# Patient Record
Sex: MALE | Race: OTHER | NOT HISPANIC OR LATINO
[De-identification: names, ages, dates, MRNs, and addresses within clinical notes are randomized per-mention and may not be internally consistent; named-entity substitution may affect disease eponyms.]

---

## 2024-05-31 PROBLEM — Z00.00 ENCOUNTER FOR PREVENTIVE HEALTH EXAMINATION: Status: ACTIVE | Noted: 2024-05-31

## 2024-06-03 PROBLEM — Z80.0 FAMILY HISTORY OF MALIGNANT NEOPLASM OF COLON: Status: ACTIVE | Noted: 2024-06-03

## 2024-06-04 ENCOUNTER — APPOINTMENT (OUTPATIENT)
Dept: RADIATION ONCOLOGY | Facility: CLINIC | Age: 59
End: 2024-06-04
Payer: COMMERCIAL

## 2024-06-04 VITALS
OXYGEN SATURATION: 98 % | BODY MASS INDEX: 20.49 KG/M2 | HEIGHT: 66.5 IN | SYSTOLIC BLOOD PRESSURE: 121 MMHG | WEIGHT: 129 LBS | HEART RATE: 78 BPM | RESPIRATION RATE: 18 BRPM | DIASTOLIC BLOOD PRESSURE: 68 MMHG

## 2024-06-04 DIAGNOSIS — Z78.9 OTHER SPECIFIED HEALTH STATUS: ICD-10-CM

## 2024-06-04 DIAGNOSIS — Z80.0 FAMILY HISTORY OF MALIGNANT NEOPLASM OF DIGESTIVE ORGANS: ICD-10-CM

## 2024-06-04 DIAGNOSIS — C20 MALIGNANT NEOPLASM OF RECTUM: ICD-10-CM

## 2024-06-04 PROCEDURE — 99205 OFFICE O/P NEW HI 60 MIN: CPT | Mod: 25

## 2024-06-04 NOTE — DISEASE MANAGEMENT
[Clinical] : TNM Stage: c [FreeTextEntry4] : undergoing staging work-up [TTNM] : 4 [NTNM] : 1 [MTNM] : - [N/A] : Currently not applicable

## 2024-06-04 NOTE — PHYSICAL EXAM
[Thin] : thin [Outer Ear] : the ears and nose were normal in appearance [Hearing Threshold Finger Rub Not [place]] : hearing was normal [] : no respiratory distress [Respiration, Rhythm And Depth] : normal respiratory rhythm and effort [Exaggerated Use Of Accessory Muscles For Inspiration] : no accessory muscle use [Abdomen Soft] : soft [Nondistended] : nondistended [Abdomen Tenderness] : non-tender [Normal Seminal Vesicles] : normal seminal vesicles [Normal Scrotum] : normal scrotum [Normal] : oriented to person, place and time, the affect was normal, the mood was normal and not anxious [FreeTextEntry1] : Exam performed with NP Linette present; patient has visible friable tumor extending from the anal verge; I was unable to perform an internal rectal exam due to severe discomfort by the patient and also near circumferential involvement of the anus with tumor making it challenging to perform SHELLY; patient has constant fecal leakage from the anus; no bulmaro blood [de-identified] : palpable approximately 1.5cm right inguinal node

## 2024-06-04 NOTE — HISTORY OF PRESENT ILLNESS
[FreeTextEntry1] : Mr. Enciso is a 58-year-old male with newly diagnosed rectal cancer.  He reports having intermittent stool incontinence for the past 2 years but has gotten progressively worse over the last 6-12 months. He also noticed bright red blood after bowel movements in the past 6 months. He has an extensive family history of colon cancer with his mother passing away from the disease in her 50s as well as his maternal uncle & grandmother.  5/29/24: Colonoscopy at [facility] with Dr. Mahan demonstrated: - tumor from rectum extends into anal canal with a 1.5cm mass extending externally - distal rectum is replaced by ulcerated mucosa, likely necrotic tumor, extending 5cm proximally; near circumferential solid mass seen from 5cm to 7cm with mild narrowing Impression: Malignant neoplasm of rectum Pathology revealed invasive adenocarcinoma, moderately differentiated.   05/31/24: CT Abdomen/Pelvis ([facility]) revealed a rectal mass with CT evidence of T3 disease and concern for T4 disease with inseparable tumor from the right seminal vesicles. No enlarged lymph nodes within the abdomen. No discrete liver lesion.  05/31/24: Patient had a consultation with Dr. Asher who recommended PET CT and MRI rectal protocol for staging.  6/4/24: Mr. Enciso presents in clinic today to discuss treatment with radiation. PET/CT was performed on 6/3/24 as per patient, awaiting results from [facility]. He is also scheduled for MRI rectum tomorrow and an initial consultation with Dr. Tuttle on 6/6/24. Patient reports to be in distress with his fecal incontinence and occasional urinary incontinence. He denies any abdominal pain, weight loss, changes in appetite. Reports a 5-6/10 rectal pain.

## 2024-06-04 NOTE — VITALS
[Maximal Pain Intensity: 6/10] : 6/10 [80: Normal activity with effort; some signs or symptoms of disease.] : 80: Normal activity with effort; some signs or symptoms of disease.  [Date: ____________] : Patient's last distress assessment performed on [unfilled]. [8 - Distress Level] : Distress Level: 8 [70: Cares for self; unalbe to carry on normal activity or do active work.] : 70: Cares for self; unable to carry on normal activity or do active work.

## 2024-06-04 NOTE — REVIEW OF SYSTEMS
[Night Sweats] : no night sweats [Fatigue] : no fatigue [Recent Change In Weight] : ~T no recent weight change [Abdominal Pain] : no abdominal pain [Vomiting] : no vomiting [Constipation] : no constipation [Diarrhea] : no diarrhea [Nocturia] : no nocturia [Urinary Frequency] : no urinary frequency [Negative] : Allergic/Immunologic [FreeTextEntry7] : fecal incontinence [FreeTextEntry8] : urinary incontinence

## 2024-06-05 ENCOUNTER — TRANSCRIPTION ENCOUNTER (OUTPATIENT)
Age: 59
End: 2024-06-05

## 2024-06-26 ENCOUNTER — NON-APPOINTMENT (OUTPATIENT)
Age: 59
End: 2024-06-26

## 2024-06-26 VITALS
RESPIRATION RATE: 16 BRPM | WEIGHT: 130 LBS | HEART RATE: 85 BPM | BODY MASS INDEX: 20.65 KG/M2 | DIASTOLIC BLOOD PRESSURE: 69 MMHG | SYSTOLIC BLOOD PRESSURE: 118 MMHG | HEIGHT: 66.5 IN | OXYGEN SATURATION: 98 %

## 2024-07-02 ENCOUNTER — NON-APPOINTMENT (OUTPATIENT)
Age: 59
End: 2024-07-02

## 2024-07-02 VITALS
BODY MASS INDEX: 20.35 KG/M2 | RESPIRATION RATE: 16 BRPM | HEART RATE: 82 BPM | WEIGHT: 128 LBS | SYSTOLIC BLOOD PRESSURE: 117 MMHG | DIASTOLIC BLOOD PRESSURE: 73 MMHG

## 2024-07-08 ENCOUNTER — NON-APPOINTMENT (OUTPATIENT)
Age: 59
End: 2024-07-08

## 2024-07-08 VITALS
SYSTOLIC BLOOD PRESSURE: 119 MMHG | RESPIRATION RATE: 16 BRPM | OXYGEN SATURATION: 100 % | HEART RATE: 77 BPM | DIASTOLIC BLOOD PRESSURE: 70 MMHG | BODY MASS INDEX: 19.95 KG/M2 | WEIGHT: 125.5 LBS

## 2024-07-16 ENCOUNTER — NON-APPOINTMENT (OUTPATIENT)
Age: 59
End: 2024-07-16

## 2024-07-16 VITALS
RESPIRATION RATE: 16 BRPM | OXYGEN SATURATION: 100 % | HEIGHT: 66.5 IN | DIASTOLIC BLOOD PRESSURE: 88 MMHG | SYSTOLIC BLOOD PRESSURE: 99 MMHG | BODY MASS INDEX: 19.85 KG/M2 | WEIGHT: 125 LBS | HEART RATE: 78 BPM

## 2024-07-16 RX ORDER — SILVER SULFADIAZINE 10 MG/G
1 CREAM TOPICAL TWICE DAILY
Qty: 1 | Refills: 1 | Status: ACTIVE | COMMUNITY
Start: 2024-07-16 | End: 1900-01-01

## 2024-07-22 ENCOUNTER — NON-APPOINTMENT (OUTPATIENT)
Age: 59
End: 2024-07-22

## 2024-07-22 VITALS
RESPIRATION RATE: 16 BRPM | DIASTOLIC BLOOD PRESSURE: 71 MMHG | HEART RATE: 70 BPM | SYSTOLIC BLOOD PRESSURE: 123 MMHG | WEIGHT: 124 LBS | OXYGEN SATURATION: 100 % | BODY MASS INDEX: 19.71 KG/M2

## 2024-07-22 NOTE — DISEASE MANAGEMENT
[Clinical] : TNM Stage: c [III] : III [TTNM] : 4 [MTNM] : - [NTNM] : 1 [de-identified] : 6967 [de-identified] : 7386 [de-identified] : Rectum/pelvis

## 2024-07-22 NOTE — DISEASE MANAGEMENT
[Clinical] : TNM Stage: c [III] : III [TTNM] : 4 [NTNM] : 1 [MTNM] : - [de-identified] : 8926 [de-identified] : 2994 [de-identified] : Rectum/pelvis

## 2024-07-22 NOTE — REVIEW OF SYSTEMS
[Constipation: Grade 0] : Constipation: Grade 0 [Diarrhea: Grade 0] : Diarrhea: Grade 0 [Fecal Incontinence: Grade 0] : Fecal Incontinence: Grade 0 [Rectal Pain: Grade 0] : Rectal Pain: Grade 0 [Fatigue: Grade 1 - Fatigue relieved by rest] : Fatigue: Grade 1 - Fatigue relieved by rest

## 2024-07-22 NOTE — HISTORY OF PRESENT ILLNESS
[FreeTextEntry1] : 6/26/2024 Mr. Enciso presents today for his OTV.  He completed 3/25 fractions to the rectum/pelvis.  The patient reports having stool incontinence with occasional blood in the stool.  He denies any pain, but reports having some discomfort when he sits at times.  His weight is stable, appetite is healthy.  7/2/2024 Mr. Enciso presents today for his OTV.  He completed 7/25 fractions to the rectum/pelvis.  The patient reports having abdominal discomfort over the weekend which has subsided.  He states he had overeaten.  His weight is stable, he reports no skin issues in the rectal area.  The patient will use Aquaphor.    7/8/24 FX 10 today.  Mr. enciso is doing well with treatment.  he confirmed that he is taking Xeloda as prescribed.  His appetite has been OK due to some nausea but has not required any antiemetics.  He still has some stool leakage and a little pressure when passing urine.     7/16/2024 Mr. Enciso presents today for his OTV.  He completed 16/25 fractions to the rectum/pelvis.  The patient denies any blood in stool, diarrhea or pain.  He has some discomfort while sitting.  His appetite is healthy, weight is stable.  Dr. Juan is prescribing Silvadene for patient to use after treatment and at night to the rectal area.  7/22/24 FX 20 of 25.  Mr. enciso confirms that he is taking xeloda as directed.  he denies any diarrhea or difficulty passing urine.  His anal area is excoriated.  He is using Silvadene as directed.

## 2024-07-24 ENCOUNTER — NON-APPOINTMENT (OUTPATIENT)
Age: 59
End: 2024-07-24

## 2024-07-31 ENCOUNTER — NON-APPOINTMENT (OUTPATIENT)
Age: 59
End: 2024-07-31

## 2024-07-31 VITALS
HEART RATE: 80 BPM | WEIGHT: 124.2 LBS | BODY MASS INDEX: 19.75 KG/M2 | SYSTOLIC BLOOD PRESSURE: 124 MMHG | DIASTOLIC BLOOD PRESSURE: 77 MMHG | RESPIRATION RATE: 16 BRPM | OXYGEN SATURATION: 100 %

## 2024-08-01 NOTE — HISTORY OF PRESENT ILLNESS
[FreeTextEntry1] : 6/26/2024 Mr. Enciso presents today for his OTV.  He completed 3/25 fractions to the rectum/pelvis.  The patient reports having stool incontinence with occasional blood in the stool.  He denies any pain, but reports having some discomfort when he sits at times.  His weight is stable, appetite is healthy.  7/2/2024 Mr. Enciso presents today for his OTV.  He completed 7/25 fractions to the rectum/pelvis.  The patient reports having abdominal discomfort over the weekend which has subsided.  He states he had overeaten.  His weight is stable, he reports no skin issues in the rectal area.  The patient will use Aquaphor.    7/8/24 FX 10 today.  Mr. enciso is doing well with treatment.  he confirmed that he is taking Xeloda as prescribed.  His appetite has been OK due to some nausea but has not required any antiemetics.  He still has some stool leakage and a little pressure when passing urine.     7/16/2024 Mr. Enciso presents today for his OTV.  He completed 16/25 fractions to the rectum/pelvis.  The patient denies any blood in stool, diarrhea or pain.  He has some discomfort while sitting.  His appetite is healthy, weight is stable.  Dr. Juan is prescribing Silvadene for patient to use after treatment and at night to the rectal area.  7/22/24 FX 20 of 25.  Mr. enciso confirms that he is taking xeloda as directed.  he denies any diarrhea or difficulty passing urine.  His anal skin is desquamating.  He is using Silvadene as directed.    7/31/24  FX 2/3 boost Pt continues to take his Xeloda as directed.  He does have diarrhea this week after taking his water in preparation for RT otherwise it it not bothersome.  His appetite remains good and he is using the Silvadene cream to the area of desquamation

## 2024-08-01 NOTE — DISEASE MANAGEMENT
[Clinical] : TNM Stage: c [III] : III [TTNM] : 4 [NTNM] : 1 [MTNM] : - [de-identified] : 6621 [de-identified] : Rectum/pelvis [de-identified] : 7249

## 2024-08-01 NOTE — PHYSICAL EXAM
[Thin] : thin [] : no respiratory distress [Exaggerated Use Of Accessory Muscles For Inspiration] : no accessory muscle use [Nondistended] : nondistended [Normal] : oriented to person, place and time, the affect was normal, the mood was normal and not anxious [FreeTextEntry1] : Exam performed with RN Greco present; grade 2 moist desquamation around the anus; significant decrease in size of protruding tumor [de-identified] : moderate erythema w/o desquamation b/l groin [de-identified] : as above

## 2024-08-01 NOTE — PHYSICAL EXAM
[Thin] : thin [] : no respiratory distress [Exaggerated Use Of Accessory Muscles For Inspiration] : no accessory muscle use [Nondistended] : nondistended [Normal] : oriented to person, place and time, the affect was normal, the mood was normal and not anxious [FreeTextEntry1] : Exam performed with RN Greco present; grade 2 moist desquamation around the anus; significant decrease in size of protruding tumor [de-identified] : moderate erythema w/o desquamation b/l groin [de-identified] : as above

## 2024-08-01 NOTE — DISEASE MANAGEMENT
[Clinical] : TNM Stage: c [III] : III [TTNM] : 4 [NTNM] : 1 [MTNM] : - [de-identified] : 4742 [de-identified] : 8659 [de-identified] : Rectum/pelvis

## 2024-08-01 NOTE — REVIEW OF SYSTEMS
[Anal Pain: Grade 1 - Mild pain] : Anal Pain: Grade 1 - Mild pain [Diarrhea: Grade 1 - Increase of <4 stools per day over baseline; mild increase in ostomy output compared to baseline] : Diarrhea: Grade 1 - Increase of <4 stools per day over baseline; mild increase in ostomy output compared to baseline [Fecal Incontinence: Grade 1 - Occasional use of pads required] : Fecal Incontinence: Grade 1 - Occasional use of pads required [Rectal Pain: Grade 1 - Mild pain] : Rectal Pain: Grade 1 - Mild pain [Fatigue: Grade 1 - Fatigue relieved by rest] : Fatigue: Grade 1 - Fatigue relieved by rest [Urinary Tract Pain: Grade 0] : Urinary Tract Pain: Grade 0 [Urinary Urgency: Grade 0] : Urinary Urgency: Grade 0 [Urinary Frequency: Grade 0] : Urinary Frequency: Grade 0 [Pruritus: Grade 0] : Pruritus: Grade 0 [Skin Hyperpigmentation: Grade 0] : Skin Hyperpigmentation: Grade 0 [Dermatitis Radiation: Grade 2 - Moderate to brisk erythema; patchy moist desquamation, mostly confined to skin folds and creases; moderate edema] : Dermatitis Radiation: Grade 2 - Moderate to brisk erythema; patchy moist desquamation, mostly confined to skin folds and creases; moderate edema

## 2024-08-19 ENCOUNTER — APPOINTMENT (OUTPATIENT)
Dept: RADIATION ONCOLOGY | Facility: CLINIC | Age: 59
End: 2024-08-19
Payer: COMMERCIAL

## 2024-08-19 VITALS
HEART RATE: 75 BPM | DIASTOLIC BLOOD PRESSURE: 62 MMHG | OXYGEN SATURATION: 98 % | BODY MASS INDEX: 19.45 KG/M2 | RESPIRATION RATE: 16 BRPM | SYSTOLIC BLOOD PRESSURE: 95 MMHG | WEIGHT: 122.31 LBS

## 2024-08-19 DIAGNOSIS — C20 MALIGNANT NEOPLASM OF RECTUM: ICD-10-CM

## 2024-08-19 PROCEDURE — 99024 POSTOP FOLLOW-UP VISIT: CPT

## 2024-08-20 NOTE — PHYSICAL EXAM
[Thin] : thin [] : no respiratory distress [Respiration, Rhythm And Depth] : normal respiratory rhythm and effort [Exaggerated Use Of Accessory Muscles For Inspiration] : no accessory muscle use [Abdomen Soft] : soft [Nondistended] : nondistended [Abdomen Tenderness] : non-tender [Normal External Genitalia] : normal external genitalia without lesions [Normal Scrotum] : normal scrotum [Normal] : oriented to person, place and time, the affect was normal, the mood was normal and not anxious [FreeTextEntry1] : Exam performed with CARRIE Sue present; significantly decreased size of protruding anal mass; no moist desquamation is still present; some dry desquamation and erythema is evident [de-identified] : Dry desquamation and hyperpigmentation b/l groin; significant decrease in size of previously palpable right inguinal node which is now < 5mm

## 2024-08-20 NOTE — REVIEW OF SYSTEMS
[Constipation: Grade 0] : Constipation: Grade 0 [Diarrhea: Grade 0] : Diarrhea: Grade 0 [Fecal Incontinence: Grade 1 - Occasional use of pads required] : Fecal Incontinence: Grade 1 - Occasional use of pads required [Rectal Pain: Grade 0] : Rectal Pain: Grade 0 [Fatigue: Grade 1 - Fatigue relieved by rest] : Fatigue: Grade 1 - Fatigue relieved by rest [Hematuria: Grade 0] : Hematuria: Grade 0 [Urinary Incontinence: Grade 0] : Urinary Incontinence: Grade 0  [Urinary Retention: Grade 0] : Urinary Retention: Grade 0 [Urinary Tract Pain: Grade 0] : Urinary Tract Pain: Grade 0 [Proctitis: Grade 0] : Proctitis: Grade 0 [Constipation] : no constipation [Diarrhea] : no diarrhea [Negative] : Endocrine [FreeTextEntry7] : fecal incontinence

## 2024-08-20 NOTE — PHYSICAL EXAM
[Thin] : thin [] : no respiratory distress [Respiration, Rhythm And Depth] : normal respiratory rhythm and effort [Exaggerated Use Of Accessory Muscles For Inspiration] : no accessory muscle use [Abdomen Soft] : soft [Nondistended] : nondistended [Abdomen Tenderness] : non-tender [Normal External Genitalia] : normal external genitalia without lesions [Normal Scrotum] : normal scrotum [Normal] : oriented to person, place and time, the affect was normal, the mood was normal and not anxious [FreeTextEntry1] : Exam performed with CARRIE Sue present; significantly decreased size of protruding anal mass; no moist desquamation is still present; some dry desquamation and erythema is evident [de-identified] : Dry desquamation and hyperpigmentation b/l groin; significant decrease in size of previously palpable right inguinal node which is now < 5mm

## 2024-08-20 NOTE — DISEASE MANAGEMENT
[Clinical] : TNM Stage: c [N/A] : Currently not applicable [Pathological] : TNM Stage: p [TTNM] : 4 [NTNM] : 2 [MTNM] : 1 [IV] : IV [de-identified] : 25 fractions to the rectum/pelvis & 3 fractions to the pelvic LN for a total of 5600 cGy completed on 8/1/24

## 2024-08-20 NOTE — HISTORY OF PRESENT ILLNESS
[FreeTextEntry1] : Mr. Enciso is a 59-year-old male with rectal cancer, Stage T4 N1 M1 due to involved inguinal nodes. He presents today for PTE s/p RT 25 fractions to the rectum/pelvis to 5000 cGy and 3 fractions to the pelvic/lymph nodes to 600 cGy = 5600cGy completed on 8/1/24.  Patient is schedule for Mediport placement on 8/23/24 and will start FOLFOX on 9/4/24 for 4 months under the care of Dr. Asher.  He had a follow-up with Dr. Tuttle on 6/20/24 and will see her again after treatment.  8/19/24 MR. Enciso presents today for PTE. He is feeling better. He denies any bleeding, constipation, diarrhea. He reports feeling fatigued and that his fecal incontinence is slightly better. He did have one episode of stomach ache/pain on Saturday, but currently denies any pain. He continues to moisturize his skin as instructed.

## 2024-08-20 NOTE — PHYSICAL EXAM
[Thin] : thin [] : no respiratory distress [Respiration, Rhythm And Depth] : normal respiratory rhythm and effort [Exaggerated Use Of Accessory Muscles For Inspiration] : no accessory muscle use [Abdomen Soft] : soft [Nondistended] : nondistended [Abdomen Tenderness] : non-tender [Normal External Genitalia] : normal external genitalia without lesions [Normal Scrotum] : normal scrotum [Normal] : oriented to person, place and time, the affect was normal, the mood was normal and not anxious [FreeTextEntry1] : Exam performed with CARRIE Sue present; significantly decreased size of protruding anal mass; no moist desquamation is still present; some dry desquamation and erythema is evident [de-identified] : Dry desquamation and hyperpigmentation b/l groin; significant decrease in size of previously palpable right inguinal node which is now < 5mm

## 2024-08-20 NOTE — PHYSICAL EXAM
[Thin] : thin [] : no respiratory distress [Respiration, Rhythm And Depth] : normal respiratory rhythm and effort [Exaggerated Use Of Accessory Muscles For Inspiration] : no accessory muscle use [Abdomen Soft] : soft [Nondistended] : nondistended [Abdomen Tenderness] : non-tender [Normal External Genitalia] : normal external genitalia without lesions [Normal Scrotum] : normal scrotum [Normal] : oriented to person, place and time, the affect was normal, the mood was normal and not anxious [FreeTextEntry1] : Exam performed with CARRIE Sue present; significantly decreased size of protruding anal mass; no moist desquamation is still present; some dry desquamation and erythema is evident [de-identified] : Dry desquamation and hyperpigmentation b/l groin; significant decrease in size of previously palpable right inguinal node which is now < 5mm

## 2024-08-20 NOTE — VITALS
[Maximal Pain Intensity: 0/10] : 0/10 [Least Pain Intensity: 0/10] : 0/10 [90: Able to carry normal activity; minor signs or symptoms of disease.] : 90: Able to carry normal activity; minor signs or symptoms of disease.  [80: Normal activity with effort; some signs or symptoms of disease.] : 80: Normal activity with effort; some signs or symptoms of disease.

## 2024-08-20 NOTE — DISEASE MANAGEMENT
[Clinical] : TNM Stage: c [N/A] : Currently not applicable [Pathological] : TNM Stage: p [TTNM] : 4 [NTNM] : 2 [MTNM] : 1 [IV] : IV [de-identified] : 25 fractions to the rectum/pelvis & 3 fractions to the pelvic LN for a total of 5600 cGy completed on 8/1/24

## 2024-08-20 NOTE — DISEASE MANAGEMENT
[Clinical] : TNM Stage: c [N/A] : Currently not applicable [Pathological] : TNM Stage: p [TTNM] : 4 [NTNM] : 2 [MTNM] : 1 [IV] : IV [de-identified] : 25 fractions to the rectum/pelvis & 3 fractions to the pelvic LN for a total of 5600 cGy completed on 8/1/24

## 2024-08-23 ENCOUNTER — TRANSCRIPTION ENCOUNTER (OUTPATIENT)
Age: 59
End: 2024-08-23

## 2024-09-23 NOTE — HISTORY OF PRESENT ILLNESS
[FreeTextEntry1] : Mr. Enciso is a 59-year-old male with rectal cancer, Stage T4 N1 M1 due to involved inguinal nodes. He presents today for PTE s/p RT 25 fractions to the rectum/pelvis to 5000 cGy and 3 fractions to the pelvic/lymph nodes to 600 cGy = 5600cGy completed on 8/1/24.  Patient is schedule for Mediport placement on 8/23/24 and will start FOLFOX on 9/4/24 for 4 months under the care of Dr. Asher.  He had a follow-up with Dr. Tuttle on 6/20/24 and will see her again after treatment.  8/19/24 MR. Enciso presents today for PTE. He is feeling better. He denies any bleeding, constipation, diarrhea. He reports feeling fatigued and that his fecal incontinence is slightly better. He did have one episode of stomach ache/pain on Saturday, but currently denies any pain. He continues to moisturize his skin as instructed. Dementia

## 2025-01-08 ENCOUNTER — NON-APPOINTMENT (OUTPATIENT)
Age: 60
End: 2025-01-08

## 2025-01-17 ENCOUNTER — NON-APPOINTMENT (OUTPATIENT)
Age: 60
End: 2025-01-17

## 2025-01-22 ENCOUNTER — NON-APPOINTMENT (OUTPATIENT)
Age: 60
End: 2025-01-22

## 2025-01-22 ENCOUNTER — APPOINTMENT (OUTPATIENT)
Dept: COLORECTAL SURGERY | Facility: CLINIC | Age: 60
End: 2025-01-22
Payer: COMMERCIAL

## 2025-01-22 DIAGNOSIS — Z82.3 FAMILY HISTORY OF STROKE: ICD-10-CM

## 2025-01-22 DIAGNOSIS — Z83.3 FAMILY HISTORY OF DIABETES MELLITUS: ICD-10-CM

## 2025-01-22 DIAGNOSIS — C20 MALIGNANT NEOPLASM OF RECTUM: ICD-10-CM

## 2025-01-22 PROCEDURE — 99204 OFFICE O/P NEW MOD 45 MIN: CPT | Mod: 93

## 2025-01-29 VITALS
WEIGHT: 114.64 LBS | OXYGEN SATURATION: 100 % | DIASTOLIC BLOOD PRESSURE: 78 MMHG | HEART RATE: 75 BPM | HEIGHT: 66.5 IN | RESPIRATION RATE: 16 BRPM | SYSTOLIC BLOOD PRESSURE: 128 MMHG | TEMPERATURE: 97 F

## 2025-01-30 ENCOUNTER — OUTPATIENT (OUTPATIENT)
Dept: INPATIENT UNIT | Facility: HOSPITAL | Age: 60
LOS: 1 days | Discharge: HOME | End: 2025-01-30
Payer: COMMERCIAL

## 2025-01-30 ENCOUNTER — APPOINTMENT (OUTPATIENT)
Dept: COLORECTAL SURGERY | Facility: HOSPITAL | Age: 60
End: 2025-01-30

## 2025-01-30 ENCOUNTER — RESULT REVIEW (OUTPATIENT)
Age: 60
End: 2025-01-30

## 2025-01-30 ENCOUNTER — TRANSCRIPTION ENCOUNTER (OUTPATIENT)
Age: 60
End: 2025-01-30

## 2025-01-30 VITALS
HEART RATE: 58 BPM | DIASTOLIC BLOOD PRESSURE: 76 MMHG | SYSTOLIC BLOOD PRESSURE: 134 MMHG | OXYGEN SATURATION: 100 % | RESPIRATION RATE: 18 BRPM

## 2025-01-30 DIAGNOSIS — Z95.828 PRESENCE OF OTHER VASCULAR IMPLANTS AND GRAFTS: Chronic | ICD-10-CM

## 2025-01-30 PROCEDURE — 45331 SIGMOIDOSCOPY AND BIOPSY: CPT | Mod: GC

## 2025-01-30 PROCEDURE — 88305 TISSUE EXAM BY PATHOLOGIST: CPT

## 2025-01-30 PROCEDURE — 88305 TISSUE EXAM BY PATHOLOGIST: CPT | Mod: 26

## 2025-01-30 PROCEDURE — 45331 SIGMOIDOSCOPY AND BIOPSY: CPT

## 2025-01-30 RX ORDER — SODIUM CHLORIDE 9 G/ML
1000 INJECTION, SOLUTION INTRAVENOUS
Refills: 0 | Status: DISCONTINUED | OUTPATIENT
Start: 2025-01-30 | End: 2025-01-30

## 2025-01-30 RX ORDER — SODIUM CHLORIDE 9 G/ML
500 INJECTION, SOLUTION INTRAVENOUS ONCE
Refills: 0 | Status: COMPLETED | OUTPATIENT
Start: 2025-01-30 | End: 2025-01-30

## 2025-01-30 RX ORDER — ACETAMINOPHEN 160 MG/5ML
1000 SUSPENSION ORAL ONCE
Refills: 0 | Status: DISCONTINUED | OUTPATIENT
Start: 2025-01-30 | End: 2025-01-30

## 2025-01-30 RX ADMIN — SODIUM CHLORIDE 75 MILLILITER(S): 9 INJECTION, SOLUTION INTRAVENOUS at 15:25

## 2025-01-30 RX ADMIN — SODIUM CHLORIDE 1000 MILLILITER(S): 9 INJECTION, SOLUTION INTRAVENOUS at 15:24

## 2025-01-30 NOTE — PROGRESS NOTE ADULT - SUBJECTIVE AND OBJECTIVE BOX
Procedure: colonoscopy, with biopsy 1/30  Surgeon: Dr. Barth    S: Pt has no complaints, reports having the same abdominal discomfort from tumor he had before. Denies any CP, SOB, N/V or difficulty breathing.    O:  T(C): 36.3 (01-30-25 @ 14:43), Max: 36.3 (01-30-25 @ 14:43)  T(F): 97.4 (01-30-25 @ 14:43), Max: 97.4 (01-30-25 @ 14:43)  HR: 57 (01-30-25 @ 16:23) (52 - 57)  BP: 135/76 (01-30-25 @ 16:23) (82/50 - 142/82)  RR: 16 (01-30-25 @ 16:23) (12 - 16)  SpO2: 99% (01-30-25 @ 16:23) (99% - 100%)  Wt(kg): --      PE:  Gen: NAD, resting comfortably in bed  C/V: NSR  Pulm: Nonlabored breathing, no respiratory distress  Abd: soft, NT, ND  Extrem: WWP, no calf edema, SCDs in place      A/P: 60 yo M who is s/p Colonoscopy, with biopsy    dc home with outpatient follow-up

## 2025-01-30 NOTE — BRIEF OPERATIVE NOTE - OPERATION/FINDINGS
Both a pediatric and adult colonoscope were used to evaluate the rectum and distal colon for residual tumor. There was a necrotic residual cavity from the prior tumor with normal appearing mucosa on either side. Multiple biopsies were taken of the rectum at approximately 8 cm.

## 2025-01-30 NOTE — ASU DISCHARGE PLAN (ADULT/PEDIATRIC) - ASU DC SPECIAL INSTRUCTIONSFT
Please follow up with Dr. Barth in 2-4 weeks. Call to make an appointment if you do not have one.     You may have some bleeding per rectum and can use gauze as needed to prevent staining.     To help reduce any discomfort, you can take tylenol and/or motrin per the instructions on the bottle.     Please resume all regular home medications unless specifically advised not to take a particular medication.     Please get plenty of rest, continue to ambulate several times per day, and drink adequate amounts of fluids.     *Please call your doctor if you have increased pain, swelling, redness, or drainage from the incision site.  *Any change in your symptoms, or any new symptoms that concern you.

## 2025-01-30 NOTE — ASU DISCHARGE PLAN (ADULT/PEDIATRIC) - FINANCIAL ASSISTANCE
[facility] provides services at a reduced cost to those who are determined to be eligible through [facility]’s financial assistance program. Information regarding [facility]’s financial assistance program can be found by going to [email or web address] or by calling [phone].

## 2025-02-10 ENCOUNTER — APPOINTMENT (OUTPATIENT)
Dept: RADIATION ONCOLOGY | Facility: CLINIC | Age: 60
End: 2025-02-10

## 2025-02-18 ENCOUNTER — NON-APPOINTMENT (OUTPATIENT)
Age: 60
End: 2025-02-18

## 2025-02-24 ENCOUNTER — APPOINTMENT (OUTPATIENT)
Dept: COLORECTAL SURGERY | Facility: CLINIC | Age: 60
End: 2025-02-24
Payer: COMMERCIAL

## 2025-02-24 DIAGNOSIS — C20 MALIGNANT NEOPLASM OF RECTUM: ICD-10-CM

## 2025-02-24 PROCEDURE — 99215 OFFICE O/P EST HI 40 MIN: CPT | Mod: 93

## 2025-03-31 ENCOUNTER — APPOINTMENT (OUTPATIENT)
Dept: COLORECTAL SURGERY | Facility: CLINIC | Age: 60
End: 2025-03-31
Payer: COMMERCIAL

## 2025-03-31 DIAGNOSIS — C20 MALIGNANT NEOPLASM OF RECTUM: ICD-10-CM

## 2025-03-31 PROCEDURE — 99215 OFFICE O/P EST HI 40 MIN: CPT | Mod: 93

## 2025-04-01 RX ORDER — METRONIDAZOLE 500 MG/1
500 TABLET ORAL
Qty: 6 | Refills: 0 | Status: ACTIVE | COMMUNITY
Start: 2025-04-01 | End: 1900-01-01

## 2025-04-01 RX ORDER — CIPROFLOXACIN HYDROCHLORIDE 500 MG/1
500 TABLET, FILM COATED ORAL
Qty: 2 | Refills: 0 | Status: ACTIVE | COMMUNITY
Start: 2025-04-01 | End: 1900-01-01

## 2025-04-21 ENCOUNTER — NON-APPOINTMENT (OUTPATIENT)
Age: 60
End: 2025-04-21

## 2025-04-24 ENCOUNTER — OUTPATIENT (OUTPATIENT)
Dept: OUTPATIENT SERVICES | Facility: HOSPITAL | Age: 60
LOS: 1 days | End: 2025-04-24
Payer: COMMERCIAL

## 2025-04-24 ENCOUNTER — RESULT REVIEW (OUTPATIENT)
Age: 60
End: 2025-04-24

## 2025-04-24 DIAGNOSIS — Z95.828 PRESENCE OF OTHER VASCULAR IMPLANTS AND GRAFTS: Chronic | ICD-10-CM

## 2025-04-24 DIAGNOSIS — C20 MALIGNANT NEOPLASM OF RECTUM: ICD-10-CM

## 2025-04-24 PROCEDURE — 88321 CONSLTJ&REPRT SLD PREP ELSWR: CPT

## 2025-04-25 ENCOUNTER — NON-APPOINTMENT (OUTPATIENT)
Age: 60
End: 2025-04-25

## 2025-05-05 VITALS
DIASTOLIC BLOOD PRESSURE: 76 MMHG | SYSTOLIC BLOOD PRESSURE: 122 MMHG | HEIGHT: 66 IN | TEMPERATURE: 97 F | WEIGHT: 121.47 LBS | RESPIRATION RATE: 18 BRPM | OXYGEN SATURATION: 99 % | HEART RATE: 75 BPM

## 2025-05-05 NOTE — PATIENT PROFILE ADULT - FALL HARM RISK - FALL HARM RISK
Goal Outcome Evaluation:       9/15/23 [id number]:    Orientation: unresponsive- DELANEY, non-verbal. Appears calm. No restless this shift.   Activity: bedrest, q2h T/R per family request.  Diet/BS Checks: puree diet, no eating and drinking. Oral care provided by family.   Tele:  none  IV Access/Drains: L PIV SL, dried blood and positional. Still flushing fine.   Pain Management: PRN IV Morphine, Roxanol. Scheduled Oxycodone.  Abnormal VS/Results: n/a  Bowel/Bladder: incontinent B/B . No voiding , no BM.   Skin/Wounds: scattered bruising  Consults:  GIP, SW  D/C Disposition: patient expected to pass in the hospital.  Other Info:  GIP hospice, Comfort care measures focus. Pt resting comfortable during shift with scheduled Oxycodone-then switching to scheduled Roxanol. No PRN meds given.  Family members at bedside with support.                     No indicators present

## 2025-05-05 NOTE — PATIENT PROFILE ADULT - FALL HARM RISK - UNIVERSAL INTERVENTIONS
Bed in lowest position, wheels locked, appropriate side rails in place/Call bell, personal items and telephone in reach/Instruct patient to call for assistance before getting out of bed or chair/Non-slip footwear when patient is out of bed/[place] to call system/Physically safe environment - no spills, clutter or unnecessary equipment/Purposeful Proactive Rounding/Room/bathroom lighting operational, light cord in reach

## 2025-05-05 NOTE — PATIENT PROFILE ADULT - DO YOU LACK THE NECESSARY SUPPORT TO HELP YOU COPE WITH LIFE CHALLENGES?
[facility] no

## 2025-05-05 NOTE — PATIENT PROFILE ADULT - STATED REASON FOR ADMISSION
[FreeTextEntry2] : left knee pain robotic abdominal perineal resection with colostomy creation , plastics reconstruction of perineum with gracilis flap

## 2025-05-06 ENCOUNTER — APPOINTMENT (OUTPATIENT)
Dept: COLORECTAL SURGERY | Facility: HOSPITAL | Age: 60
End: 2025-05-06

## 2025-05-06 ENCOUNTER — RESULT REVIEW (OUTPATIENT)
Age: 60
End: 2025-05-06

## 2025-05-06 ENCOUNTER — INPATIENT (INPATIENT)
Facility: HOSPITAL | Age: 60
LOS: 12 days | Discharge: HOME HEALTH | End: 2025-05-19
Attending: SURGERY | Admitting: SURGERY
Payer: COMMERCIAL

## 2025-05-06 ENCOUNTER — TRANSCRIPTION ENCOUNTER (OUTPATIENT)
Age: 60
End: 2025-05-06

## 2025-05-06 DIAGNOSIS — Z95.828 PRESENCE OF OTHER VASCULAR IMPLANTS AND GRAFTS: Chronic | ICD-10-CM

## 2025-05-06 LAB
BLD GP AB SCN SERPL QL: NEGATIVE — SIGNIFICANT CHANGE UP
RH IG SCN BLD-IMP: POSITIVE — SIGNIFICANT CHANGE UP

## 2025-05-06 PROCEDURE — 88300 SURGICAL PATH GROSS: CPT | Mod: 26,59

## 2025-05-06 PROCEDURE — 15734 MUSCLE-SKIN GRAFT TRUNK: CPT | Mod: AS

## 2025-05-06 PROCEDURE — 45395 LAP REMOVAL OF RECTUM: CPT | Mod: 62

## 2025-05-06 PROCEDURE — 27080 REMOVAL OF TAIL BONE: CPT | Mod: AS,GC

## 2025-05-06 PROCEDURE — 45395 LAP REMOVAL OF RECTUM: CPT | Mod: AS,GC

## 2025-05-06 PROCEDURE — 52005 CYSTO W/URTRL CATHJ: CPT | Mod: 22

## 2025-05-06 PROCEDURE — 88309 TISSUE EXAM BY PATHOLOGIST: CPT | Mod: 26

## 2025-05-06 PROCEDURE — 45395 LAP REMOVAL OF RECTUM: CPT | Mod: GC,62

## 2025-05-06 PROCEDURE — 27080 REMOVAL OF TAIL BONE: CPT | Mod: GC

## 2025-05-06 DEVICE — STAPLER RELOAD SUREFORM 4-ROW 30X8MM BLU: Type: IMPLANTABLE DEVICE | Status: FUNCTIONAL

## 2025-05-06 DEVICE — URETERAL CATH OPEN END 5FR 70CM: Type: IMPLANTABLE DEVICE | Status: FUNCTIONAL

## 2025-05-06 DEVICE — GUIDEWIRE SENSOR DUAL-FLEX NITINOL STRAIGHT .035" X 150CM: Type: IMPLANTABLE DEVICE | Status: FUNCTIONAL

## 2025-05-06 RX ORDER — HEPARIN SODIUM 1000 [USP'U]/ML
5000 INJECTION INTRAVENOUS; SUBCUTANEOUS ONCE
Refills: 0 | Status: COMPLETED | OUTPATIENT
Start: 2025-05-06 | End: 2025-05-06

## 2025-05-06 RX ORDER — KETOROLAC TROMETHAMINE 30 MG/ML
15 INJECTION, SOLUTION INTRAMUSCULAR; INTRAVENOUS EVERY 6 HOURS
Refills: 0 | Status: DISCONTINUED | OUTPATIENT
Start: 2025-05-06 | End: 2025-05-09

## 2025-05-06 RX ORDER — ACETAMINOPHEN 500 MG/5ML
1000 LIQUID (ML) ORAL ONCE
Refills: 0 | Status: COMPLETED | OUTPATIENT
Start: 2025-05-06 | End: 2025-05-06

## 2025-05-06 RX ORDER — HYDROMORPHONE/SOD CHLOR,ISO/PF 2 MG/10 ML
0.5 SYRINGE (ML) INJECTION EVERY 4 HOURS
Refills: 0 | Status: DISCONTINUED | OUTPATIENT
Start: 2025-05-06 | End: 2025-05-08

## 2025-05-06 RX ORDER — BUPIVACAINE 13.3 MG/ML
20 INJECTION, SUSPENSION, LIPOSOMAL INFILTRATION ONCE
Refills: 0 | Status: DISCONTINUED | OUTPATIENT
Start: 2025-05-06 | End: 2025-05-06

## 2025-05-06 RX ORDER — ACETAMINOPHEN 500 MG/5ML
1000 LIQUID (ML) ORAL EVERY 6 HOURS
Refills: 0 | Status: DISCONTINUED | OUTPATIENT
Start: 2025-05-06 | End: 2025-05-10

## 2025-05-06 RX ORDER — HEPARIN SODIUM 1000 [USP'U]/ML
5000 INJECTION INTRAVENOUS; SUBCUTANEOUS EVERY 8 HOURS
Refills: 0 | Status: DISCONTINUED | OUTPATIENT
Start: 2025-05-06 | End: 2025-05-15

## 2025-05-06 RX ORDER — ONDANSETRON HCL/PF 4 MG/2 ML
4 VIAL (ML) INJECTION EVERY 6 HOURS
Refills: 0 | Status: DISCONTINUED | OUTPATIENT
Start: 2025-05-06 | End: 2025-05-15

## 2025-05-06 RX ORDER — SODIUM CHLORIDE 9 G/1000ML
1000 INJECTION, SOLUTION INTRAVENOUS
Refills: 0 | Status: DISCONTINUED | OUTPATIENT
Start: 2025-05-06 | End: 2025-05-11

## 2025-05-06 RX ADMIN — Medication 1000 MILLIGRAM(S): at 19:22

## 2025-05-06 RX ADMIN — HEPARIN SODIUM 5000 UNIT(S): 1000 INJECTION INTRAVENOUS; SUBCUTANEOUS at 07:45

## 2025-05-06 RX ADMIN — SODIUM CHLORIDE 40 MILLILITER(S): 9 INJECTION, SOLUTION INTRAVENOUS at 17:53

## 2025-05-06 RX ADMIN — HEPARIN SODIUM 5000 UNIT(S): 1000 INJECTION INTRAVENOUS; SUBCUTANEOUS at 22:09

## 2025-05-06 RX ADMIN — KETOROLAC TROMETHAMINE 15 MILLIGRAM(S): 30 INJECTION, SOLUTION INTRAMUSCULAR; INTRAVENOUS at 17:32

## 2025-05-06 RX ADMIN — Medication 1000 MILLIGRAM(S): at 07:45

## 2025-05-06 RX ADMIN — KETOROLAC TROMETHAMINE 15 MILLIGRAM(S): 30 INJECTION, SOLUTION INTRAMUSCULAR; INTRAVENOUS at 23:25

## 2025-05-06 RX ADMIN — Medication 4 MILLIGRAM(S): at 17:32

## 2025-05-06 RX ADMIN — Medication 1000 MILLIGRAM(S): at 08:04

## 2025-05-06 NOTE — OPERATIVE REPORT - OPERATIVE RPOSRT DETAILS
Preoperative diagnosis:   Rectal cancer   Perineal defect s/p abdominoperineal resection     Postoperative diagnosis:   Same      Procedure:   1. Left pedicled gracilis myofascial flap for obliteration of pelvic defect (87629)  2. Complex closure of perineal wound ~15cm (23950, 49394)  3. Complex closure of Left thigh wound ~10-cm length (90214, 15430)     Attending Surgeon: Westley Sánchez MD    Assistant: MANN Barone    I performed all portions of this case with assistance .     Type of Anesthesia: GETA     Fluids: See anesthesia notes     EBL: 10-ml from Plastics portion     Specimens: None per Plastics     Implants: None     Wound class: Clean      Complications: None apparent     Indications: This is a 59 year old male with rectal cancer presenting for abdominal perineal resection and is indicated for such by the Colorectal surgery team. Plastic surgery assistance is requested for management of anticipated complex pelvic and defect and perineal wound.   After a thorough discussion of the risks of surgical intervention including partial or total flap loss, wound dehiscence in perineum or donor site, hematoma, seroma, pain or paresthesias, donor site numbness, bleeding, infection, abscess, injury to surrounding structures requiring intervention or potentially leading to transient or permanent deficits, abnormal or excessive scarring, permanent nerve damage with weakness, need for further revisional or staged surgery, as well as its benefits and alternatives, the patient consented to the procedure with full comprehension confirmed verbally and consent documented in chart.     Details: The patient was encountered prone following coccygectomy and closure of posteriormost aspect of perineal wound by the colorectal surgery team. I assisted with mobilizing the patient into the supine lithotomy position. The perineal defect which measured 15cm in anteroposterior dimension with a 7cm defect width. The preexisting drapes were taken down, the Left thigh was marked out for planned gracilis flap coverage, and given the patient's baseline skin redundancy in the perineal area, a skin paddle was not anticipated. The entire perineum, including the open wound, bilateral thighs circumferentially past the knees, and lower abdomen were prepped out with chloroprep and betadine, and re-draped sterilely.     A time out was performed for this portion of the case, the surgical markings were reinforced, and a longitudinal 10cm incision anterior to the axis of the palpated gracilis muscle was made. Dissection was continued through the subcutaneous tissue and fascia overlying the musculotendinous portion of the gracilis. The interval between the gracilis and adductor longus was appreciated and entered, and dissection carried proximally till the pedicle was appreciated and protected. The pedicle was noted about 8-cm from the groin, with a gracilis muscle of moderate bulk. The pedicle was dissected proximally until the takeoff from the medial circumflex femoral vessels and freed circumferentially to enhance mobility and flap coverage. The gracilis muscle was dissected circumferentially free of surrounding soft tissue attachments using cautery, blunt dissection and the Impact LigaSure device, first proximally to the inferior pubic ramus, then distally towards the insertion at the knee. Once the musculotendinous junction was encountered, the tendinous portion was encircled digitally and divided with the Impact LigaSure device to liberate the muscle. A distal counter incision was thus not required. The gracilis muscle was again confirmed freed up proximal to the vascular pedicle to allow rotation into the perineum off its vascular leash. Electrocautery was used throughout to provide hemostasis.    Once ample reach was attained, the perineal wound defect was again explored and copiously irrigated. Blunt dissection was used to create a perineal subcutaneous tunnel superficial to the proximal origin of the gracilis muscle at the ischiopubic ramus into the perineal defect, and digitally widened to allow smooth passage of the pedicled flap without tension or compression. The flap was then passed into the perineum, and the distal end of the gracilis muscle was parachuted with 2-0 PDS sutures cephalad and posteriorly to provide a hammock to the abdominopelvic structures, all the way up to the pelvic outlet, to obliterate the pelvic dead space. Ample blood flow to the flap was confirmed with doppler ultrasound. The muscle was then inset near circumferentially to fill the dead space securely close off the pelvic outlet. Once the gracilis pedicled flap was adequately inset to fill the pelvic and perineal dead space, bilateral subcutaneous dissection was performed at least 10-cm on either side of the wound opening into the medial thighs, to allow ample mobility for closure over the muscle flap. The flaps were then easily medialized under physiologic tension, and the rest of the perineal closure was performed from anterior to posterior in layers with 2-0 PDS sutures, and the perineal skin was approximated with 3-0 subcuticular sutures.    Attention was then turned to the 10-cm donor site. First, hemostasis was obtained and irrigation performed. A 10-mm flat AMANDA drain was passed from upper lateral Left thigh into the medial thigh donor area, secured with 2-0 silk sutures at the skin, and the fascial layer was loosely approximated with 2-0 PDS interrupted sutures. The deep dermis was closed with 2-0 PDS sutures as well. Subcuticular sutures were deemed redundant given the excellent skin approximation with the dermal sutures. The entire field was then cleansed and dressings consisting of mastisol, steri strips and aquacel were placed on the thigh, while the perineum was dressed with bacitracin, gauze fluffs and mesh underwear, and a loose circumferential ace wrap was placed over the thigh. The drain was connected to bulb suction, and all sponge and instrument counts were accurate at the end of the case. The patient was awoken by the anesthesia team and transported to the PACU in stable condition.     Westley Sánchez MD  [phone]   Preoperative diagnosis:   Rectal cancer   Perineal defect s/p abdominoperineal resection     Postoperative diagnosis:   Same      Procedure:   1. Left pedicled gracilis myofascial flap for obliteration of pelvic defect (84699)  2. Complex closure of perineal wound ~15cm (60203, 61372)  3. Complex closure of Left thigh wound ~10-cm length (47484, 76564)     Attending Surgeon: Westley Sánchez MD    Assistant: MANN Barone    I performed all portions of this case with assistance     Type of Anesthesia: GETA     Fluids: See anesthesia notes     EBL: 10-ml from Plastics portion     Specimens: None per Plastics     Implants: None     Wound class: Clean      Complications: None apparent     Indications: This is a 59 year old male with rectal cancer presenting for abdominal perineal resection and is indicated for such by the Colorectal surgery team. Plastic surgery assistance is requested for management of anticipated complex pelvic and defect and perineal wound.   After a thorough discussion of the risks of surgical intervention including partial or total flap loss, wound dehiscence in perineum or donor site, hematoma, seroma, pain or paresthesias, donor site numbness, bleeding, infection, abscess, injury to surrounding structures requiring intervention or potentially leading to transient or permanent deficits, abnormal or excessive scarring, permanent nerve damage with weakness, need for further revisional or staged surgery, as well as its benefits and alternatives, the patient consented to the procedure with full comprehension confirmed verbally and consent documented in chart.     Details: The patient was encountered prone following coccygectomy and closure of posteriormost aspect of perineal wound by the colorectal surgery team. I assisted with mobilizing the patient into the supine lithotomy position. The perineal defect which measured 15cm in anteroposterior dimension with a 7cm defect width. The preexisting drapes were taken down, the Left thigh was marked out for planned gracilis flap coverage, and given the patient's baseline skin redundancy in the perineal area, a skin paddle was not anticipated. The entire perineum, including the open wound, bilateral thighs circumferentially past the knees, and lower abdomen were prepped out with chloroprep and betadine, and re-draped sterilely.     A time out was performed for this portion of the case, the surgical markings were reinforced, and a longitudinal 10cm incision anterior to the axis of the palpated gracilis muscle was made. Dissection was continued through the subcutaneous tissue and fascia overlying the musculotendinous portion of the gracilis. The interval between the gracilis and adductor longus was appreciated and entered, and dissection carried proximally till the pedicle was appreciated and protected. The pedicle was noted about 8-cm from the groin, with a gracilis muscle of moderate bulk. The pedicle was dissected proximally until the takeoff from the medial circumflex femoral vessels and freed circumferentially to enhance mobility and flap coverage. The gracilis muscle was dissected circumferentially free of surrounding soft tissue attachments using cautery, blunt dissection and the Impact LigaSure device, first proximally to the inferior pubic ramus, then distally towards the insertion at the knee. Once the musculotendinous junction was encountered, the tendinous portion was encircled digitally and divided with the Impact LigaSure device to liberate the muscle. A distal counter incision was thus not required. The gracilis muscle was again confirmed freed up proximal to the vascular pedicle to allow rotation into the perineum off its vascular leash. Electrocautery was used throughout to provide hemostasis.    Once ample reach was attained, the perineal wound defect was again explored and copiously irrigated. Blunt dissection was used to create a perineal subcutaneous tunnel superficial to the proximal origin of the gracilis muscle at the ischiopubic ramus into the perineal defect, and digitally widened to allow smooth passage of the pedicled flap without tension or compression. The flap was then passed into the perineum, and the distal end of the gracilis muscle was parachuted with 2-0 PDS sutures cephalad and posteriorly to provide a hammock to the abdominopelvic structures, all the way up to the pelvic outlet, to obliterate the pelvic dead space. Ample blood flow to the flap was confirmed with doppler ultrasound. The muscle was then inset near circumferentially to fill the dead space securely close off the pelvic outlet. Once the gracilis pedicled flap was adequately inset to fill the pelvic and perineal dead space, bilateral subcutaneous dissection was performed at least 10-cm on either side of the wound opening into the medial thighs, to allow ample mobility for closure over the muscle flap. The flaps were then easily medialized under physiologic tension, and the rest of the perineal closure was performed from anterior to posterior in layers with 2-0 PDS sutures, and the perineal skin was approximated with 3-0 subcuticular sutures.    Attention was then turned to the 10-cm donor site. First, hemostasis was obtained and irrigation performed. A 10-mm flat AMANDA drain was passed from upper lateral Left thigh into the medial thigh donor area, secured with 2-0 silk sutures at the skin, and the fascial layer was loosely approximated with 2-0 PDS interrupted sutures. The deep dermis was closed with 2-0 PDS sutures as well. Subcuticular sutures were deemed redundant given the excellent skin approximation with the dermal sutures. The entire field was then cleansed and dressings consisting of mastisol, steri strips and aquacel were placed on the thigh, while the perineum was dressed with bacitracin, gauze fluffs and mesh underwear, and a loose circumferential ace wrap was placed over the thigh. The drain was connected to bulb suction, and all sponge and instrument counts were accurate at the end of the case. The patient was awoken by the anesthesia team and transported to the PACU in stable condition.     Westley Sánchez MD  [phone]

## 2025-05-06 NOTE — H&P ADULT - HISTORY OF PRESENT ILLNESS
59 year old male with PMH of rectal cancer presents for abdominal perineal resection. Patient underwent colonoscopy on 5/29/24 which showed a mass extending into anal canal, ulcerated mucosa, likely necrotic tumor, pathology consistent with invasive moderately differentiated adenocarcinoma. Patient completed FOLFOX chemotherapy on 12/11/24. Restaging complete and patient was referred for surgery. Currently without complaints.

## 2025-05-06 NOTE — H&P ADULT - ASSESSMENT
59 year old male with PMH of rectal cancer presents for abdominal perineal resection.     to OR  admission post op  ERAS protocol  Colon bundle

## 2025-05-06 NOTE — PROGRESS NOTE ADULT - SUBJECTIVE AND OBJECTIVE BOX
Procedure: 5/6  cystoscopy bilateral stent placement, robotic assisted APR, coccygectomy, end colostomy creation, L gracilis flap recon  Surgeons: Dr. Barth / Dr. Sánchez / Dr. Augustin    S: Pt has no complaints. Denies CP, SOB, DIALLO, calf tenderness. Pain controlled with medication.    O:  T(C): 36.7 (05-06-25 @ 15:20), Max: 36.7 (05-06-25 @ 15:20)  T(F): 98 (05-06-25 @ 15:20), Max: 98 (05-06-25 @ 15:20)  HR: 66 (05-06-25 @ 16:50) (60 - 74)  BP: 141/68 (05-06-25 @ 16:50) (124/66 - 157/74)  RR: 17 (05-06-25 @ 16:50) (13 - 17)  SpO2: 100% (05-06-25 @ 16:50) (98% - 100%)  Wt(kg): --          PHYSICAL EXAM  Gen: NAD, resting comfortably in bed  C/V: NSR  Pulm: Nonlabored breathing, no respiratory distress  Abd: soft, appropriately tender/ND Incisions: CDI; ostomy PPP, no output  Extrem: WWP, no calf edema, SCDs in place

## 2025-05-06 NOTE — BRIEF OPERATIVE NOTE - NSICDXBRIEFPROCEDURE_GEN_ALL_CORE_FT
PROCEDURES:  Cystoscopy, with bilateral ureteral catheterization 06-May-2025 09:13:07 ICG b/l, Ozzie North   PROCEDURES:  Cystoscopy, with bilateral ureteral catheterization 06-May-2025 09:13:07 ICG b/l, freeman, and removal of bladder stones, Ozzie Boss

## 2025-05-06 NOTE — OPERATIVE REPORT - OPERATION/FINDINGS
Large perineal defect (coccygectomy defect closed by the Colorectal surgery team) ~15-cm in AP dimension, and 7cm width, small bowel loops noted prolapsing through the pelvic outlet with paucity of soft tissue within the pelvic space.

## 2025-05-06 NOTE — BRIEF OPERATIVE NOTE - NSICDXBRIEFPROCEDURE_GEN_ALL_CORE_FT
PROCEDURES:  Robot-assisted laparoscopic abdominoperineal resection (APR) 06-May-2025 12:52:58  Inga Figueroa  Coccygectomy 06-May-2025 12:53:05  Inga Figueroa

## 2025-05-06 NOTE — BRIEF OPERATIVE NOTE - PRIMARY SURGEON
2020    [address]  [facility] [address]      [facility]  Clinic Note    Guy Lombardo  : 1962  PCP: Mendoza Ortiz MD    Reason for Visit:     Chief Complaint   Patient presents with   • Follow-up     3 month   • Hypertension   • Hyperlipidemia       History of Present Illness:   Follow-up visit.  This is a telephone visit due to the ongoing viral pandemic.  We obtained informed consent for the patient prior to proceeding with this form of interaction    No major change in his clinical status.  Unfortunately he was unable to tolerate Crestor.  Soon after resuming it he had an aggravation of his muscle cramping and myalgia.  Complicating his evaluation is he has chronic low back discomfort that is probably osteoarthritic in nature as well as bilateral wrist discomfort which I certain is related to his occupation.  He is tried 2 different statin agents now and has been intolerant of both.  We will have to pursue injectable therapy    He has had no suggestion of recurrent tachycardia clinically.    His wife works as a .  Her sister had a markedly abnormal heart scan and needed intervention.  His wife's scan apparently came out favorably    PMHx:     Past Medical History:   Diagnosis Date   • Diabetes (CMS/HCC)    • Dyslipidemia     Dec. 2018   • Essential hypertension    • Supraventricular tachycardia (CMS/HCC)        PSHx:     Past Surgical History:   Procedure Laterality Date   • Ankle scope,aid repair fx,bone defct     • Pr catheter, ablation      Dec. 2018   • Total knee replacement         Family Hx:     Family History   Problem Relation Age of Onset   • Patient is unaware of any medical problems Mother    • Coronary Artery Disease Father         late 30's early 40's       Social Hx:     Social History     Tobacco Use   • Smoking status: Never Smoker   • Smokeless tobacco: Never Used   • Tobacco comment: Never used tobacco. Denies smoking.   Substance  Use Topics   • Alcohol use: Never     Frequency: Never     Comment: denies drinking   • Drug use: No       Allergies:      ALLERGIES:   Allergen Reactions   • Iodine   (Environmental Or Med) HIVES and RASH       Medications:     Current Outpatient Medications   Medication Sig Dispense Refill   • meloxicam (MOBIC) 15 MG tablet      • gabapentin (NEURONTIN) 300 MG capsule 3 times daily.     • rosuvastatin (CRESTOR) 20 MG tablet Take 1 tablet by mouth daily. 90 tablet 3   • losartan (COZAAR) 100 MG tablet Take 1 tablet by mouth daily. 90 tablet 3   • hydrochlorothiazide (HYDRODIURIL) 25 MG tablet Take 1 tablet by mouth daily. 90 tablet 3   • metFORMIN (GLUCOPHAGE-XR) 500 MG 24 hr tablet Take 1 tablet by mouth daily (with breakfast). 180 tablet 1   • losartan-hydrochlorothiazide (HYZAAR) 100-25 MG per tablet Take 1 tablet by mouth daily. 90 tablet 3   • metaxalone (SKELAXIN) 800 MG tablet Take 1 tablet by mouth nightly as needed for Muscle spasms. 30 tablet 1   • ibuprofen (MOTRIN) 800 MG tablet 1 tablet 3 times daily.       No current facility-administered medications for this visit.        Review of Systems:   12 point systems reviewed and negative or clinically irrelevent except as             noted in HPI    Physical Exam:   Vitals:   Visit Vitals  /84   Pulse 85   Wt 125.6 kg (277 lb)   BMI 42.12 kg/m²      No formal examination as this was a telephone visit  Labs:     Cholesterol, Total (mg/dL)   Date Value   12/21/2016 250        LDL Cholesterol (mg/dL)   Date Value   12/21/2016 181        HDL Cholesterol (mg/dL)   Date Value   12/21/2016 43        TRIGLYCERIDES (mg/dL)   Date Value   10/07/2019 133      No results found for: AST   No results found for: ALT    Recent Labs     10/07/19  0809   TRIGLYCERIDE 133       Impression:         1. PSVT (paroxysmal supraventricular tachycardia) (CMS/HCC)    2. Benign essential hypertension    3. Hypercholesterolemia         Asymptomatic gentleman with an abnormal  ultrafast heart scan.  Associated hypertension dyslipidemia and paroxysmal supraventricular tachycardia with prior catheter ablation.  Unfortunately intolerant of statin therapy    Plan:   Will proceed with injectable therapy.  His wife is contacting their insurance company to see what is covered.  There is a new oral agent as well if we have insurance troubles with injectable therapy    For going to stay on his current blood pressure regimen.  I will plan to see him in the [place] office in 6 months.    We reviewed these issues.  I think he has a good understanding and seems comfortable with this approach      Andrea Marie MD  04/01/20     <<----- Click to Select Surgeon Westley Sánchez (Attending)

## 2025-05-06 NOTE — PROGRESS NOTE ADULT - ASSESSMENT
59 year old male with PMH of rectal cancer now s/p cystoscopy bilateral stent placement, robotic assisted APR, coccygectomy, end colostomy creation, L gracilis flap recon    CLD/IVF  pain/nausea control  DVT ppx  freeman 7-10 days  plastics recs: shuffle gait, no sitting, no abduction  AM labs

## 2025-05-07 LAB
ANION GAP SERPL CALC-SCNC: 11 MMOL/L — SIGNIFICANT CHANGE UP (ref 5–17)
BUN SERPL-MCNC: 22 MG/DL — SIGNIFICANT CHANGE UP (ref 7–23)
CALCIUM SERPL-MCNC: 9.1 MG/DL — SIGNIFICANT CHANGE UP (ref 8.4–10.5)
CHLORIDE SERPL-SCNC: 102 MMOL/L — SIGNIFICANT CHANGE UP (ref 96–108)
CO2 SERPL-SCNC: 24 MMOL/L — SIGNIFICANT CHANGE UP (ref 22–31)
CREAT SERPL-MCNC: 0.92 MG/DL — SIGNIFICANT CHANGE UP (ref 0.5–1.3)
EGFR: 96 ML/MIN/1.73M2 — SIGNIFICANT CHANGE UP
EGFR: 96 ML/MIN/1.73M2 — SIGNIFICANT CHANGE UP
GLUCOSE SERPL-MCNC: 111 MG/DL — HIGH (ref 70–99)
HCT VFR BLD CALC: 33.3 % — LOW (ref 39–50)
HGB BLD-MCNC: 10.9 G/DL — LOW (ref 13–17)
MAGNESIUM SERPL-MCNC: 2 MG/DL — SIGNIFICANT CHANGE UP (ref 1.6–2.6)
MCHC RBC-ENTMCNC: 29.1 PG — SIGNIFICANT CHANGE UP (ref 27–34)
MCHC RBC-ENTMCNC: 32.7 G/DL — SIGNIFICANT CHANGE UP (ref 32–36)
MCV RBC AUTO: 89 FL — SIGNIFICANT CHANGE UP (ref 80–100)
NRBC BLD AUTO-RTO: 0 /100 WBCS — SIGNIFICANT CHANGE UP (ref 0–0)
PHOSPHATE SERPL-MCNC: 3.8 MG/DL — SIGNIFICANT CHANGE UP (ref 2.5–4.5)
PLATELET # BLD AUTO: 255 K/UL — SIGNIFICANT CHANGE UP (ref 150–400)
POTASSIUM SERPL-MCNC: 4.4 MMOL/L — SIGNIFICANT CHANGE UP (ref 3.5–5.3)
POTASSIUM SERPL-SCNC: 4.4 MMOL/L — SIGNIFICANT CHANGE UP (ref 3.5–5.3)
RBC # BLD: 3.74 M/UL — LOW (ref 4.2–5.8)
RBC # FLD: 14.5 % — SIGNIFICANT CHANGE UP (ref 10.3–14.5)
SODIUM SERPL-SCNC: 137 MMOL/L — SIGNIFICANT CHANGE UP (ref 135–145)
WBC # BLD: 13.05 K/UL — HIGH (ref 3.8–10.5)
WBC # FLD AUTO: 13.05 K/UL — HIGH (ref 3.8–10.5)

## 2025-05-07 RX ADMIN — Medication 0.5 MILLIGRAM(S): at 20:31

## 2025-05-07 RX ADMIN — Medication 1000 MILLIGRAM(S): at 01:04

## 2025-05-07 RX ADMIN — KETOROLAC TROMETHAMINE 15 MILLIGRAM(S): 30 INJECTION, SOLUTION INTRAMUSCULAR; INTRAVENOUS at 11:11

## 2025-05-07 RX ADMIN — HEPARIN SODIUM 5000 UNIT(S): 1000 INJECTION INTRAVENOUS; SUBCUTANEOUS at 22:10

## 2025-05-07 RX ADMIN — KETOROLAC TROMETHAMINE 15 MILLIGRAM(S): 30 INJECTION, SOLUTION INTRAMUSCULAR; INTRAVENOUS at 23:48

## 2025-05-07 RX ADMIN — Medication 1000 MILLIGRAM(S): at 11:11

## 2025-05-07 RX ADMIN — KETOROLAC TROMETHAMINE 15 MILLIGRAM(S): 30 INJECTION, SOLUTION INTRAMUSCULAR; INTRAVENOUS at 17:46

## 2025-05-07 RX ADMIN — HEPARIN SODIUM 5000 UNIT(S): 1000 INJECTION INTRAVENOUS; SUBCUTANEOUS at 13:47

## 2025-05-07 RX ADMIN — SODIUM CHLORIDE 40 MILLILITER(S): 9 INJECTION, SOLUTION INTRAVENOUS at 17:46

## 2025-05-07 RX ADMIN — Medication 0.5 MILLIGRAM(S): at 21:31

## 2025-05-07 RX ADMIN — KETOROLAC TROMETHAMINE 15 MILLIGRAM(S): 30 INJECTION, SOLUTION INTRAMUSCULAR; INTRAVENOUS at 06:13

## 2025-05-07 RX ADMIN — HEPARIN SODIUM 5000 UNIT(S): 1000 INJECTION INTRAVENOUS; SUBCUTANEOUS at 06:14

## 2025-05-07 RX ADMIN — Medication 1000 MILLIGRAM(S): at 23:48

## 2025-05-07 RX ADMIN — Medication 1000 MILLIGRAM(S): at 17:46

## 2025-05-07 NOTE — PROGRESS NOTE ADULT - SUBJECTIVE AND OBJECTIVE BOX
STATUS-POST: cystoscopy bilateral stent placement, robotic assisted APR, coccygectomy, end colostomy creation, L gracilis flap recon (5/6)    POST-OP DAY: #1    SUBJECTIVE:      MEDICATIONS  (STANDING):  acetaminophen     Tablet .. 1000 milliGRAM(s) Oral every 6 hours  heparin   Injectable 5000 Unit(s) SubCutaneous every 8 hours  ketorolac   Injectable 15 milliGRAM(s) IV Push every 6 hours  lactated ringers. 1000 milliLiter(s) (40 mL/Hr) IV Continuous <Continuous>    MEDICATIONS  (PRN):  HYDROmorphone  Injectable 0.5 milliGRAM(s) IV Push every 4 hours PRN Severe Pain (7 - 10)  ondansetron Injectable 4 milliGRAM(s) IV Push every 6 hours PRN Nausea and/or Vomiting      Vital Signs Last 24 Hrs  T(C): 36.7 (07 May 2025 05:23), Max: 36.8 (06 May 2025 23:42)  T(F): 98.1 (07 May 2025 05:23), Max: 98.2 (06 May 2025 23:42)  HR: 57 (07 May 2025 05:23) (57 - 75)  BP: 106/63 (07 May 2025 05:23) (106/63 - 157/74)  BP(mean): 98 (06 May 2025 17:20) (89 - 107)  RR: 18 (07 May 2025 05:23) (13 - 18)  SpO2: 100% (07 May 2025 05:23) (98% - 100%)    Parameters below as of 07 May 2025 05:23  Patient On (Oxygen Delivery Method): room air        PHYSICAL EXAM  General: NAD, resting comfortably  Pulmonary: normal resp effort  Cardiovascular: NSR  Abdominal: soft, ND/NT, incisions CDI, colostomy   Extremities: WWP, no clubbing/cyanosis/edema  Neuro: A/O x 3, no focal deficits    I&O's Detail    06 May 2025 07:01  -  07 May 2025 05:59  --------------------------------------------------------  IN:    Lactated Ringers: 520 mL  Total IN: 520 mL    OUT:    Bulb (mL): 115 mL    Bulb (mL): 8 mL    Colostomy (mL): 0 mL    Indwelling Catheter - Urethral (mL): 1605 mL  Total OUT: 1728 mL    Total NET: -1208 mL          LABS:                        10.9   13.05 )-----------( 255      ( 07 May 2025 05:54 )             33.3                 RADIOLOGY & ADDITIONAL STUDIES:

## 2025-05-07 NOTE — PHYSICAL THERAPY INITIAL EVALUATION ADULT - DID THE PATIENT HAVE SURGERY?
cystoscopy bilateral stent placement, robotic assisted APR, coccygectomy, end colostomy creation, L gracilis flap recon/yes

## 2025-05-07 NOTE — PHYSICAL THERAPY INITIAL EVALUATION ADULT - ADDITIONAL COMMENTS
independent prior to arrival, no use of AD, difficulty toileting, no falls, 30 steps to enter, has straight cane

## 2025-05-07 NOTE — PROGRESS NOTE ADULT - SUBJECTIVE AND OBJECTIVE BOX
Plastic Surgery Progress Note (pg [id number]: [id number], [facility]: [phone])    SUBJECTIVE  The patient was seen and examined. No acute events overnight.    OBJECTIVE  ___________________________________________________  VITAL SIGNS / I&O's   Vital Signs Last 24 Hrs  T(C): 36.7 (07 May 2025 05:23), Max: 36.8 (06 May 2025 23:42)  T(F): 98.1 (07 May 2025 05:23), Max: 98.2 (06 May 2025 23:42)  HR: 57 (07 May 2025 05:23) (57 - 75)  BP: 106/63 (07 May 2025 05:23) (106/63 - 157/74)  BP(mean): 98 (06 May 2025 17:20) (89 - 107)  RR: 18 (07 May 2025 05:23) (13 - 18)  SpO2: 100% (07 May 2025 05:23) (98% - 100%)    Parameters below as of 07 May 2025 05:23  Patient On (Oxygen Delivery Method): room air          06 May 2025 07:01  -  07 May 2025 07:00  --------------------------------------------------------  IN:    Lactated Ringers: 520 mL  Total IN: 520 mL    OUT:    Bulb (mL): 115 mL    Bulb (mL): 8 mL    Colostomy (mL): 0 mL    Indwelling Catheter - Urethral (mL): 1605 mL  Total OUT: 1728 mL    Total NET: -1208 mL        ___________________________________________________  PHYSICAL EXAM    -- CONSTITUTIONAL: NAD, lying in bed  -- NEURO: Awake, alert  sacrum - incision cdi  LE L thigh - dressing cdi  drain ss    ___________________________________________________  LABS                        10.9   13.05 )-----------( 255      ( 07 May 2025 05:54 )             33.3     07 May 2025 05:54    137    |  102    |  22     ----------------------------<  111    4.4     |  24     |  0.92     Ca    9.1        07 May 2025 05:54  Phos  3.8       07 May 2025 05:54  Mg     2.0       07 May 2025 05:54        CAPILLARY BLOOD GLUCOSE      POCT Blood Glucose.: 97 mg/dL (06 May 2025 07:54)        Urinalysis Basic - ( 07 May 2025 05:54 )    Color: x / Appearance: x / SG: x / pH: x  Gluc: 111 mg/dL / Ketone: x  / Bili: x / Urobili: x   Blood: x / Protein: x / Nitrite: x   Leuk Esterase: x / RBC: x / WBC x   Sq Epi: x / Non Sq Epi: x / Bacteria: x      ___________________________________________________  MICRO  Recent Cultures:    ___________________________________________________  MEDICATIONS  (STANDING):  acetaminophen     Tablet .. 1000 milliGRAM(s) Oral every 6 hours  heparin   Injectable 5000 Unit(s) SubCutaneous every 8 hours  ketorolac   Injectable 15 milliGRAM(s) IV Push every 6 hours  lactated ringers. 1000 milliLiter(s) (40 mL/Hr) IV Continuous <Continuous>    MEDICATIONS  (PRN):  HYDROmorphone  Injectable 0.5 milliGRAM(s) IV Push every 4 hours PRN Severe Pain (7 - 10)  ondansetron Injectable 4 milliGRAM(s) IV Push every 6 hours PRN Nausea and/or Vomiting

## 2025-05-07 NOTE — PHYSICAL THERAPY INITIAL EVALUATION ADULT - PRECAUTIONS/LIMITATIONS, REHAB EVAL
shuffling gait, no ADDUCTION, Team 5 reported to go with Plastics recommendations/fall precautions/surgical precautions

## 2025-05-07 NOTE — PHYSICAL THERAPY INITIAL EVALUATION ADULT - GENERAL OBSERVATIONS, REHAB EVAL
Received supine denies pain +2JP, IV hep, freeman, ostomy. Left as found +RN amanda pardo, call bermeo

## 2025-05-07 NOTE — PROGRESS NOTE ADULT - SUBJECTIVE AND OBJECTIVE BOX
INTERVAL HPI/OVERNIGHT EVENTS: janette CLD -n/-v, pain controlled with regimen, freeman tubing with leakage - nursing reinforced with tape    STATUS POST:  cystoscopy bilateral stent placement, robotic assisted APR, coccygectomy, end colostomy creation, gracilis flap recon    POST OPERATIVE DAY #: 1    SUBJECTIVE:  pt seen at bedside, feeling good. some nausea overnight, no emesis.     MEDICATIONS  (STANDING):  acetaminophen     Tablet .. 1000 milliGRAM(s) Oral every 6 hours  heparin   Injectable 5000 Unit(s) SubCutaneous every 8 hours  ketorolac   Injectable 15 milliGRAM(s) IV Push every 6 hours  lactated ringers. 1000 milliLiter(s) (40 mL/Hr) IV Continuous <Continuous>    MEDICATIONS  (PRN):  HYDROmorphone  Injectable 0.5 milliGRAM(s) IV Push every 4 hours PRN Severe Pain (7 - 10)  ondansetron Injectable 4 milliGRAM(s) IV Push every 6 hours PRN Nausea and/or Vomiting      Vital Signs Last 24 Hrs  T(C): 36.7 (07 May 2025 05:23), Max: 36.8 (06 May 2025 23:42)  T(F): 98.1 (07 May 2025 05:23), Max: 98.2 (06 May 2025 23:42)  HR: 57 (07 May 2025 05:23) (57 - 75)  BP: 106/63 (07 May 2025 05:23) (106/63 - 157/74)  BP(mean): 98 (06 May 2025 17:20) (89 - 107)  RR: 18 (07 May 2025 05:23) (13 - 18)  SpO2: 100% (07 May 2025 05:23) (98% - 100%)    Parameters below as of 07 May 2025 05:23  Patient On (Oxygen Delivery Method): room air        PHYSICAL EXAM:      Constitutional: A&Ox3    Respiratory: non labored breathing, no respiratory distress    Cardiovascular: NSR, RRR    Gastrointestinal: soft, nondistended, appropriate incisional tenderness, incisions c/d/i, AMANDA x 1 to self suction with minimal serosang output, ostomy pink and patent with bowel sweat    Extremities: (-) edema                  I&O's Detail    06 May 2025 07:01  -  07 May 2025 06:52  --------------------------------------------------------  IN:    Lactated Ringers: 520 mL  Total IN: 520 mL    OUT:    Bulb (mL): 115 mL    Bulb (mL): 8 mL    Colostomy (mL): 0 mL    Indwelling Catheter - Urethral (mL): 1605 mL  Total OUT: 1728 mL    Total NET: -1208 mL          LABS:                        10.9   13.05 )-----------( 255      ( 07 May 2025 05:54 )             33.3     05-07    137  |  102  |  22  ----------------------------<  111[H]  4.4   |  24  |  0.92    Ca    9.1      07 May 2025 05:54  Phos  3.8     05-07  Mg     2.0     05-07        Urinalysis Basic - ( 07 May 2025 05:54 )    Color: x / Appearance: x / SG: x / pH: x  Gluc: 111 mg/dL / Ketone: x  / Bili: x / Urobili: x   Blood: x / Protein: x / Nitrite: x   Leuk Esterase: x / RBC: x / WBC x   Sq Epi: x / Non Sq Epi: x / Bacteria: x        RADIOLOGY & ADDITIONAL STUDIES:

## 2025-05-07 NOTE — PROGRESS NOTE ADULT - ASSESSMENT
59 year old male with PMH of rectal cancer now s/p cystoscopy bilateral stent placement, robotic assisted APR, coccygectomy, end colostomy creation, L gracilis flap recon (5/6).    CLD/IVF  Pain/nausea control   DVT ppx  Singleton  Plastics recs: shuffle gait, no sitting, no abduction  Ostomy consult  AM labs

## 2025-05-07 NOTE — PROGRESS NOTE ADULT - ASSESSMENT
59 year old male with PMH of rectal cancer now s/p cystoscopy bilateral stent placement, robotic assisted APR, coccygectomy, end colostomy creation, L gracilis flap recon (5/6).    Plan  - keep thigh drain  - abduction - no adduction  - shuffle walk  - rest per primary  59 year old male with PMH of rectal cancer now s/p cystoscopy bilateral stent placement, robotic assisted APR, coccygectomy, end colostomy creation, L gracilis flap recon (5/6).    Plan  - keep thigh drain  - abduction - no adduction  - shuffle walk  - PRN robaxim   - rest per primary

## 2025-05-08 LAB
ANION GAP SERPL CALC-SCNC: 12 MMOL/L — SIGNIFICANT CHANGE UP (ref 5–17)
BUN SERPL-MCNC: 18 MG/DL — SIGNIFICANT CHANGE UP (ref 7–23)
CALCIUM SERPL-MCNC: 9.1 MG/DL — SIGNIFICANT CHANGE UP (ref 8.4–10.5)
CHLORIDE SERPL-SCNC: 107 MMOL/L — SIGNIFICANT CHANGE UP (ref 96–108)
CO2 SERPL-SCNC: 23 MMOL/L — SIGNIFICANT CHANGE UP (ref 22–31)
CREAT SERPL-MCNC: 0.76 MG/DL — SIGNIFICANT CHANGE UP (ref 0.5–1.3)
EGFR: 104 ML/MIN/1.73M2 — SIGNIFICANT CHANGE UP
EGFR: 104 ML/MIN/1.73M2 — SIGNIFICANT CHANGE UP
GLUCOSE SERPL-MCNC: 95 MG/DL — SIGNIFICANT CHANGE UP (ref 70–99)
HCT VFR BLD CALC: 36.8 % — LOW (ref 39–50)
HGB BLD-MCNC: 11.8 G/DL — LOW (ref 13–17)
MAGNESIUM SERPL-MCNC: 2.1 MG/DL — SIGNIFICANT CHANGE UP (ref 1.6–2.6)
MCHC RBC-ENTMCNC: 29.9 PG — SIGNIFICANT CHANGE UP (ref 27–34)
MCHC RBC-ENTMCNC: 32.1 G/DL — SIGNIFICANT CHANGE UP (ref 32–36)
MCV RBC AUTO: 93.2 FL — SIGNIFICANT CHANGE UP (ref 80–100)
NRBC BLD AUTO-RTO: 0 /100 WBCS — SIGNIFICANT CHANGE UP (ref 0–0)
PHOSPHATE SERPL-MCNC: 2.5 MG/DL — SIGNIFICANT CHANGE UP (ref 2.5–4.5)
PLATELET # BLD AUTO: 279 K/UL — SIGNIFICANT CHANGE UP (ref 150–400)
POTASSIUM SERPL-MCNC: 4 MMOL/L — SIGNIFICANT CHANGE UP (ref 3.5–5.3)
POTASSIUM SERPL-SCNC: 4 MMOL/L — SIGNIFICANT CHANGE UP (ref 3.5–5.3)
RBC # BLD: 3.95 M/UL — LOW (ref 4.2–5.8)
RBC # FLD: 15 % — HIGH (ref 10.3–14.5)
SODIUM SERPL-SCNC: 142 MMOL/L — SIGNIFICANT CHANGE UP (ref 135–145)
WBC # BLD: 11.63 K/UL — HIGH (ref 3.8–10.5)
WBC # FLD AUTO: 11.63 K/UL — HIGH (ref 3.8–10.5)

## 2025-05-08 PROCEDURE — 99233 SBSQ HOSP IP/OBS HIGH 50: CPT

## 2025-05-08 RX ORDER — HYDROMORPHONE/SOD CHLOR,ISO/PF 2 MG/10 ML
0.5 SYRINGE (ML) INJECTION EVERY 6 HOURS
Refills: 0 | Status: DISCONTINUED | OUTPATIENT
Start: 2025-05-08 | End: 2025-05-15

## 2025-05-08 RX ORDER — OXYCODONE HYDROCHLORIDE 30 MG/1
2.5 TABLET ORAL EVERY 6 HOURS
Refills: 0 | Status: DISCONTINUED | OUTPATIENT
Start: 2025-05-08 | End: 2025-05-15

## 2025-05-08 RX ORDER — OXYCODONE HYDROCHLORIDE 30 MG/1
5 TABLET ORAL EVERY 6 HOURS
Refills: 0 | Status: DISCONTINUED | OUTPATIENT
Start: 2025-05-08 | End: 2025-05-15

## 2025-05-08 RX ADMIN — KETOROLAC TROMETHAMINE 15 MILLIGRAM(S): 30 INJECTION, SOLUTION INTRAMUSCULAR; INTRAVENOUS at 06:32

## 2025-05-08 RX ADMIN — Medication 1000 MILLIGRAM(S): at 14:32

## 2025-05-08 RX ADMIN — KETOROLAC TROMETHAMINE 15 MILLIGRAM(S): 30 INJECTION, SOLUTION INTRAMUSCULAR; INTRAVENOUS at 17:10

## 2025-05-08 RX ADMIN — HEPARIN SODIUM 5000 UNIT(S): 1000 INJECTION INTRAVENOUS; SUBCUTANEOUS at 23:00

## 2025-05-08 RX ADMIN — KETOROLAC TROMETHAMINE 15 MILLIGRAM(S): 30 INJECTION, SOLUTION INTRAMUSCULAR; INTRAVENOUS at 23:00

## 2025-05-08 RX ADMIN — HEPARIN SODIUM 5000 UNIT(S): 1000 INJECTION INTRAVENOUS; SUBCUTANEOUS at 06:32

## 2025-05-08 RX ADMIN — KETOROLAC TROMETHAMINE 15 MILLIGRAM(S): 30 INJECTION, SOLUTION INTRAMUSCULAR; INTRAVENOUS at 11:40

## 2025-05-08 RX ADMIN — Medication 1000 MILLIGRAM(S): at 09:11

## 2025-05-08 RX ADMIN — HEPARIN SODIUM 5000 UNIT(S): 1000 INJECTION INTRAVENOUS; SUBCUTANEOUS at 14:32

## 2025-05-08 RX ADMIN — SODIUM CHLORIDE 95 MILLILITER(S): 9 INJECTION, SOLUTION INTRAVENOUS at 07:20

## 2025-05-08 RX ADMIN — Medication 1000 MILLIGRAM(S): at 20:20

## 2025-05-08 NOTE — CHART NOTE - NSCHARTNOTEFT_GEN_A_CORE
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 Cysto stent placement notable for BPH and bladder stones, which were evacuated, otherwise WNL. Freeman left in place at start of case.     Given BPH/Stones and leonardo-urethral dissection, patient should have urologic follow up--patient desires f/up in [facility].       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 Cysto stent placement notable for BPH and bladder stones, which were evacuated, otherwise WNL. Freeman left in place at start of case.     Given BPH/Stones and leonardo-urethral dissection, patient should have urologic follow up--patient desires f/up in [facility]. Phone number for [facility]: [phone]      As well, patient may maintain freeman for 7-10 days to allow urethral rest and healing    Discussed w/attending    DK

## 2025-05-08 NOTE — PROGRESS NOTE ADULT - SUBJECTIVE AND OBJECTIVE BOX
STATUS-POST: cystoscopy bilateral stent placement, robotic assisted APR, coccygectomy, end colostomy creation, L gracilis flap recon (5/6)    POST-OP DAY: #2    ON: soft, nontender, nondistended. Avi clears -n/-v, ostomy -gas,/minimal succus, mena x 2 ss    SUBJECTIVE:      MEDICATIONS  (STANDING):  acetaminophen     Tablet .. 1000 milliGRAM(s) Oral every 6 hours  heparin   Injectable 5000 Unit(s) SubCutaneous every 8 hours  ketorolac   Injectable 15 milliGRAM(s) IV Push every 6 hours  lactated ringers. 1000 milliLiter(s) (40 mL/Hr) IV Continuous <Continuous>    MEDICATIONS  (PRN):  HYDROmorphone  Injectable 0.5 milliGRAM(s) IV Push every 4 hours PRN Severe Pain (7 - 10)  ondansetron Injectable 4 milliGRAM(s) IV Push every 6 hours PRN Nausea and/or Vomiting      Vital Signs Last 24 Hrs  T(C): 36.3 (08 May 2025 05:37), Max: 37 (07 May 2025 20:25)  T(F): 97.3 (08 May 2025 05:37), Max: 98.6 (07 May 2025 20:25)  HR: 59 (08 May 2025 05:37) (55 - 60)  BP: 137/74 (08 May 2025 05:37) (103/59 - 137/74)  BP(mean): 81 (07 May 2025 15:37) (78 - 81)  RR: 17 (08 May 2025 05:37) (15 - 17)  SpO2: 97% (08 May 2025 05:37) (97% - 98%)    Parameters below as of 08 May 2025 05:37  Patient On (Oxygen Delivery Method): room air        PHYSICAL EXAM  General: NAD, resting comfortably  Pulmonary: normal resp effort  Cardiovascular: NSR  Abdominal: soft, nondistended, appropriate incisional tenderness, incisions c/d/i, MENA x 1 to self suction with minimal serosang output, ostomy pink and patent with bowel sweat  Genitourinary: freeman with CYU  Extremities: WWP, no clubbing/cyanosis/edema  Neuro: A/O x 3, no focal deficits    I&O's Detail    06 May 2025 07:01  -  07 May 2025 07:00  --------------------------------------------------------  IN:    Lactated Ringers: 520 mL  Total IN: 520 mL    OUT:    Bulb (mL): 115 mL    Bulb (mL): 8 mL    Colostomy (mL): 0 mL    Indwelling Catheter - Urethral (mL): 1605 mL  Total OUT: 1728 mL    Total NET: -1208 mL      07 May 2025 07:01  -  08 May 2025 06:02  --------------------------------------------------------  IN:    Lactated Ringers: 920 mL  Total IN: 920 mL    OUT:    Bulb (mL): 465 mL    Bulb (mL): 25 mL    Colostomy (mL): 275 mL    Indwelling Catheter - Urethral (mL): 950 mL  Total OUT: 1715 mL    Total NET: -795 mL          LABS:                        10.9   13.05 )-----------( 255      ( 07 May 2025 05:54 )             33.3     05-07    137  |  102  |  22  ----------------------------<  111[H]  4.4   |  24  |  0.92    Ca    9.1      07 May 2025 05:54  Phos  3.8     05-07  Mg     2.0     05-07        Urinalysis Basic - ( 07 May 2025 05:54 )    Color: x / Appearance: x / SG: x / pH: x  Gluc: 111 mg/dL / Ketone: x  / Bili: x / Urobili: x   Blood: x / Protein: x / Nitrite: x   Leuk Esterase: x / RBC: x / WBC x   Sq Epi: x / Non Sq Epi: x / Bacteria: x        RADIOLOGY & ADDITIONAL STUDIES: STATUS-POST: cystoscopy bilateral stent placement, robotic assisted APR, coccygectomy, end colostomy creation, L gracilis flap recon (5/6)    POST-OP DAY: #2    ON: soft, nontender, nondistended. Avi clears -n/-v, ostomy -gas,/minimal succus, mena x 2 ss    SUBJECTIVE: Patient seen and examined at bedside. Moderate abd/perineal pain controlled with medication. Reports mild ostomy output, no gas. Denies nausea/vomiting.      MEDICATIONS  (STANDING):  acetaminophen     Tablet .. 1000 milliGRAM(s) Oral every 6 hours  heparin   Injectable 5000 Unit(s) SubCutaneous every 8 hours  ketorolac   Injectable 15 milliGRAM(s) IV Push every 6 hours  lactated ringers. 1000 milliLiter(s) (40 mL/Hr) IV Continuous <Continuous>    MEDICATIONS  (PRN):  HYDROmorphone  Injectable 0.5 milliGRAM(s) IV Push every 4 hours PRN Severe Pain (7 - 10)  ondansetron Injectable 4 milliGRAM(s) IV Push every 6 hours PRN Nausea and/or Vomiting      Vital Signs Last 24 Hrs  T(C): 36.3 (08 May 2025 05:37), Max: 37 (07 May 2025 20:25)  T(F): 97.3 (08 May 2025 05:37), Max: 98.6 (07 May 2025 20:25)  HR: 59 (08 May 2025 05:37) (55 - 60)  BP: 137/74 (08 May 2025 05:37) (103/59 - 137/74)  BP(mean): 81 (07 May 2025 15:37) (78 - 81)  RR: 17 (08 May 2025 05:37) (15 - 17)  SpO2: 97% (08 May 2025 05:37) (97% - 98%)    Parameters below as of 08 May 2025 05:37  Patient On (Oxygen Delivery Method): room air        PHYSICAL EXAM  General: NAD, resting comfortably  Pulmonary: normal resp effort  Cardiovascular: NSR  Abdominal: soft, nondistended, appropriate incisional tenderness, incisions c/d/i, MENA x 1 to self suction with moderate serosang output, ostomy pink and patent with bowel sweat  Genitourinary: freeman with CYU  Extremities: WWP, no clubbing/cyanosis/edema, JPx1 L thigh, minimal SS output  Neuro: A/O x 3, no focal deficits    I&O's Detail    06 May 2025 07:01  -  07 May 2025 07:00  --------------------------------------------------------  IN:    Lactated Ringers: 520 mL  Total IN: 520 mL    OUT:    Bulb (mL): 115 mL    Bulb (mL): 8 mL    Colostomy (mL): 0 mL    Indwelling Catheter - Urethral (mL): 1605 mL  Total OUT: 1728 mL    Total NET: -1208 mL      07 May 2025 07:01  -  08 May 2025 06:02  --------------------------------------------------------  IN:    Lactated Ringers: 920 mL  Total IN: 920 mL    OUT:    Bulb (mL): 465 mL    Bulb (mL): 25 mL    Colostomy (mL): 275 mL    Indwelling Catheter - Urethral (mL): 950 mL  Total OUT: 1715 mL    Total NET: -795 mL          LABS:                        10.9   13.05 )-----------( 255      ( 07 May 2025 05:54 )             33.3     05-07    137  |  102  |  22  ----------------------------<  111[H]  4.4   |  24  |  0.92    Ca    9.1      07 May 2025 05:54  Phos  3.8     05-07  Mg     2.0     05-07        Urinalysis Basic - ( 07 May 2025 05:54 )    Color: x / Appearance: x / SG: x / pH: x  Gluc: 111 mg/dL / Ketone: x  / Bili: x / Urobili: x   Blood: x / Protein: x / Nitrite: x   Leuk Esterase: x / RBC: x / WBC x   Sq Epi: x / Non Sq Epi: x / Bacteria: x        RADIOLOGY & ADDITIONAL STUDIES:

## 2025-05-08 NOTE — PROGRESS NOTE ADULT - SUBJECTIVE AND OBJECTIVE BOX
Progress Note      Subjective  59 year old male with PMH of rectal cancer now s/p cystoscopy bilateral stent placement, robotic assisted APR, coccygectomy, end colostomy creation, L gracilis flap recon (5/6). Somewhat overwhelmed with drains / freeman care. Resting comfortably    Objective    Vital signs  T(F): , Max: 98.6 (05-07-25 @ 20:25)  HR: 60  BP: 138/76  SpO2: 98%    Output   UOP:   OUT:    Bulb (mL): 465 mL    Bulb (mL): 25 mL    Indwelling Catheter - Urethral (mL): 950 mL  Total OUT: 1440 mL      Gen  Abd    .pe    Diet/Fluids  ,     Labs  Chem:     Cultures:  Urine Cx:   Blood Cx:     Antibiotics:      Imaging

## 2025-05-08 NOTE — PROGRESS NOTE ADULT - ASSESSMENT
59 year old male with PMH of rectal cancer now s/p cystoscopy bilateral stent placement, robotic assisted APR, coccygectomy, end colostomy creation, L gracilis flap recon (5/6).    CLD/IVF  Pain/nausea control   DVT ppx  Singleton  Plastics recs: shuffle gait, no sitting, no adduction  Ostomy consult  AM labs  Dispo: Outpatient PT 59 year old male with PMH of rectal cancer now s/p cystoscopy bilateral stent placement, robotic assisted APR, coccygectomy, end colostomy creation, L gracilis flap recon (5/6).    CLD/IVF  Pain/nausea control   DVT ppx  Singleton  AMANDA x2 (pelvis, L thigh)  Plastics recs: shuffle gait, no sitting, no adduction  Ostomy consult  AM labs  Dispo: Outpatient PT

## 2025-05-08 NOTE — PROGRESS NOTE ADULT - SUBJECTIVE AND OBJECTIVE BOX
Plastic Surgery Progress Note (pg [facility]: [id number], [facility]: [phone])    SUBJECTIVE  The patient was seen and examined. No acute events overnight.    OBJECTIVE  ___________________________________________________  VITAL SIGNS / I&O's   Vital Signs Last 24 Hrs  T(C): 36.3 (08 May 2025 05:37), Max: 37 (07 May 2025 20:25)  T(F): 97.3 (08 May 2025 05:37), Max: 98.6 (07 May 2025 20:25)  HR: 59 (08 May 2025 05:37) (55 - 60)  BP: 137/74 (08 May 2025 05:37) (103/59 - 137/74)  BP(mean): 81 (07 May 2025 15:37) (78 - 81)  RR: 17 (08 May 2025 05:37) (15 - 17)  SpO2: 97% (08 May 2025 05:37) (97% - 98%)    Parameters below as of 08 May 2025 05:37  Patient On (Oxygen Delivery Method): room air          07 May 2025 07:01  -  08 May 2025 07:00  --------------------------------------------------------  IN:    Lactated Ringers: 920 mL  Total IN: 920 mL    OUT:    Bulb (mL): 465 mL    Bulb (mL): 25 mL    Colostomy (mL): 275 mL    Indwelling Catheter - Urethral (mL): 950 mL  Total OUT: 1715 mL    Total NET: -795 mL        ___________________________________________________  PHYSICAL EXAM    -- CONSTITUTIONAL: NAD, lying in bed  -- NEURO: Awake, alert  sacrum - incision cdi  LE L thigh - dressing cdi  drain ss    ___________________________________________________  LABS                        11.8   11.63 )-----------( 279      ( 08 May 2025 05:51 )             36.8     08 May 2025 05:51    142    |  107    |  18     ----------------------------<  95     4.0     |  23     |  0.76     Ca    9.1        08 May 2025 05:51  Phos  2.5       08 May 2025 05:51  Mg     2.1       08 May 2025 05:51        CAPILLARY BLOOD GLUCOSE            Urinalysis Basic - ( 08 May 2025 05:51 )    Color: x / Appearance: x / SG: x / pH: x  Gluc: 95 mg/dL / Ketone: x  / Bili: x / Urobili: x   Blood: x / Protein: x / Nitrite: x   Leuk Esterase: x / RBC: x / WBC x   Sq Epi: x / Non Sq Epi: x / Bacteria: x      ___________________________________________________  MICRO  Recent Cultures:  Specimen Source: Urine #1 Urine Culture, 05-06 @ 08:55; Results   No growth; Gram Stain: --; Organism: --    ___________________________________________________  MEDICATIONS  (STANDING):  acetaminophen     Tablet .. 1000 milliGRAM(s) Oral every 6 hours  heparin   Injectable 5000 Unit(s) SubCutaneous every 8 hours  ketorolac   Injectable 15 milliGRAM(s) IV Push every 6 hours  lactated ringers. 1000 milliLiter(s) (40 mL/Hr) IV Continuous <Continuous>    MEDICATIONS  (PRN):  HYDROmorphone  Injectable 0.5 milliGRAM(s) IV Push every 4 hours PRN Severe Pain (7 - 10)  ondansetron Injectable 4 milliGRAM(s) IV Push every 6 hours PRN Nausea and/or Vomiting

## 2025-05-08 NOTE — PROGRESS NOTE ADULT - ASSESSMENT
59 year old male with PMH of rectal cancer now s/p cystoscopy bilateral stent placement, robotic assisted APR, coccygectomy, end colostomy creation, L gracilis flap recon (5/6).    Plan  - keep thigh drain  - abduction - no adduction  - shuffle walk  - PRN robaxim   - rest per primary

## 2025-05-08 NOTE — PROGRESS NOTE ADULT - ASSESSMENT
59 year old male with PMH of rectal cancer now s/p cystoscopy bilateral stent placement, robotic assisted APR, coccygectomy, end colostomy creation, L gracilis flap recon (5/6). Long discussion today re stones / bph and delayed testing for possible treatment after full recovery from APR.    Plan  - keep freeman for 7-10 days  - please see chart note for Urology Group close to patient's home  - He will need urology follow up if discharged with freeman catheter and also to monitor BPH / bladder stones.

## 2025-05-09 LAB
ANION GAP SERPL CALC-SCNC: 14 MMOL/L — SIGNIFICANT CHANGE UP (ref 5–17)
BUN SERPL-MCNC: 21 MG/DL — SIGNIFICANT CHANGE UP (ref 7–23)
CALCIUM SERPL-MCNC: 9 MG/DL — SIGNIFICANT CHANGE UP (ref 8.4–10.5)
CHLORIDE SERPL-SCNC: 104 MMOL/L — SIGNIFICANT CHANGE UP (ref 96–108)
CO2 SERPL-SCNC: 22 MMOL/L — SIGNIFICANT CHANGE UP (ref 22–31)
CREAT SERPL-MCNC: 0.72 MG/DL — SIGNIFICANT CHANGE UP (ref 0.5–1.3)
EGFR: 106 ML/MIN/1.73M2 — SIGNIFICANT CHANGE UP
EGFR: 106 ML/MIN/1.73M2 — SIGNIFICANT CHANGE UP
GLUCOSE SERPL-MCNC: 89 MG/DL — SIGNIFICANT CHANGE UP (ref 70–99)
HCT VFR BLD CALC: 36.2 % — LOW (ref 39–50)
HGB BLD-MCNC: 11.6 G/DL — LOW (ref 13–17)
MAGNESIUM SERPL-MCNC: 1.8 MG/DL — SIGNIFICANT CHANGE UP (ref 1.6–2.6)
MCHC RBC-ENTMCNC: 29.7 PG — SIGNIFICANT CHANGE UP (ref 27–34)
MCHC RBC-ENTMCNC: 32 G/DL — SIGNIFICANT CHANGE UP (ref 32–36)
MCV RBC AUTO: 92.6 FL — SIGNIFICANT CHANGE UP (ref 80–100)
NRBC BLD AUTO-RTO: 0 /100 WBCS — SIGNIFICANT CHANGE UP (ref 0–0)
PHOSPHATE SERPL-MCNC: 2.8 MG/DL — SIGNIFICANT CHANGE UP (ref 2.5–4.5)
PLATELET # BLD AUTO: 284 K/UL — SIGNIFICANT CHANGE UP (ref 150–400)
POTASSIUM SERPL-MCNC: 3.8 MMOL/L — SIGNIFICANT CHANGE UP (ref 3.5–5.3)
POTASSIUM SERPL-SCNC: 3.8 MMOL/L — SIGNIFICANT CHANGE UP (ref 3.5–5.3)
RBC # BLD: 3.91 M/UL — LOW (ref 4.2–5.8)
RBC # FLD: 14.8 % — HIGH (ref 10.3–14.5)
SODIUM SERPL-SCNC: 140 MMOL/L — SIGNIFICANT CHANGE UP (ref 135–145)
WBC # BLD: 9.64 K/UL — SIGNIFICANT CHANGE UP (ref 3.8–10.5)
WBC # FLD AUTO: 9.64 K/UL — SIGNIFICANT CHANGE UP (ref 3.8–10.5)

## 2025-05-09 RX ORDER — MAGNESIUM SULFATE 500 MG/ML
1 SYRINGE (ML) INJECTION ONCE
Refills: 0 | Status: COMPLETED | OUTPATIENT
Start: 2025-05-09 | End: 2025-05-09

## 2025-05-09 RX ORDER — SODIUM CHLORIDE 9 G/1000ML
1000 INJECTION, SOLUTION INTRAVENOUS ONCE
Refills: 0 | Status: COMPLETED | OUTPATIENT
Start: 2025-05-09 | End: 2025-05-09

## 2025-05-09 RX ORDER — SODIUM CHLORIDE 9 G/1000ML
500 INJECTION, SOLUTION INTRAVENOUS ONCE
Refills: 0 | Status: COMPLETED | OUTPATIENT
Start: 2025-05-09 | End: 2025-05-09

## 2025-05-09 RX ADMIN — KETOROLAC TROMETHAMINE 15 MILLIGRAM(S): 30 INJECTION, SOLUTION INTRAMUSCULAR; INTRAVENOUS at 06:10

## 2025-05-09 RX ADMIN — Medication 1000 MILLIGRAM(S): at 21:13

## 2025-05-09 RX ADMIN — HEPARIN SODIUM 5000 UNIT(S): 1000 INJECTION INTRAVENOUS; SUBCUTANEOUS at 06:10

## 2025-05-09 RX ADMIN — SODIUM CHLORIDE 95 MILLILITER(S): 9 INJECTION, SOLUTION INTRAVENOUS at 15:09

## 2025-05-09 RX ADMIN — HEPARIN SODIUM 5000 UNIT(S): 1000 INJECTION INTRAVENOUS; SUBCUTANEOUS at 14:58

## 2025-05-09 RX ADMIN — KETOROLAC TROMETHAMINE 15 MILLIGRAM(S): 30 INJECTION, SOLUTION INTRAMUSCULAR; INTRAVENOUS at 12:20

## 2025-05-09 RX ADMIN — Medication 1000 MILLIGRAM(S): at 03:51

## 2025-05-09 RX ADMIN — Medication 1000 MILLIGRAM(S): at 15:08

## 2025-05-09 RX ADMIN — HEPARIN SODIUM 5000 UNIT(S): 1000 INJECTION INTRAVENOUS; SUBCUTANEOUS at 22:25

## 2025-05-09 RX ADMIN — Medication 20 MILLIEQUIVALENT(S): at 09:29

## 2025-05-09 RX ADMIN — SODIUM CHLORIDE 1000 MILLILITER(S): 9 INJECTION, SOLUTION INTRAVENOUS at 05:21

## 2025-05-09 RX ADMIN — Medication 100 GRAM(S): at 09:29

## 2025-05-09 RX ADMIN — SODIUM CHLORIDE 500 MILLILITER(S): 9 INJECTION, SOLUTION INTRAVENOUS at 07:24

## 2025-05-09 RX ADMIN — Medication 1000 MILLIGRAM(S): at 09:29

## 2025-05-09 NOTE — DIETITIAN INITIAL EVALUATION ADULT - NSFNSGIIOFT_GEN_A_CORE
05-08-25 @ 07:01  -  05-09-25 @ 07:00  --------------------------------------------------------  OUT:    Colostomy (mL): 130 mL  Total OUT: 130 mL    Total NET: -130 mL      05-09-25 @ 07:01  -  05-09-25 @ 14:12  --------------------------------------------------------  OUT:    Colostomy (mL): 75 mL  Total OUT: 75 mL    Total NET: -75 mL

## 2025-05-09 NOTE — DIETITIAN INITIAL EVALUATION ADULT - OTHER INFO
"59 year old male with PMH of rectal cancer now s/p cystoscopy bilateral stent placement, robotic assisted APR, coccygectomy, end colostomy creation, L gracilis flap recon (5/6)."     Patient seen at bedside on 9ur for nutrition assessment. Reports good appetite prior to admission, endorses consuming 3 meals/day and denies following therapeutic diet / dietary restrictions. Reports appetite increased x past 3 months due to improved sense of taste following completed chemo tx in January - likely meeting >/= 75% estimated nutrient needs at home. Current diet order: on clear liquid diet. Complains of "chemical taste" in mouth and therefore with poor PO intake of liquids but able to tolerate. Reports known food allergy to marlyn - added to EMR / CBORD. No difficulty chewing/swallowing reported. Dosing weight: 121 pounds, reports wt gain since January (status post chemo). No pressure injuries documented, pardeep 20. No edema documented. Denies nausea/vomiting/diarrhea/constipation; colostomy present, pending increased flatus per surgery team. Labs: nutritionally pertinent labs WNL. Meds: LR, zofran PRN, oxycodone PRN. NFPE revealed mild clavicle wasting - does not meet criteria for protein-calorie malnutrition as per ASPEN guidelines at this time. No cultural, ethnic, Latter day food preferences, or difficulties with food cost/ procurement reported. See nutrition recommendations below.

## 2025-05-09 NOTE — PROGRESS NOTE ADULT - SUBJECTIVE AND OBJECTIVE BOX
STATUS-POST: cystoscopy bilateral stent placement, robotic assisted APR, coccygectomy, end colostomy creation, L gracilis flap recon (5/6)    POST-OP DAY: #3    SUBJECTIVE: Patient seen and examined at bedside. Moderate abd/perineal pain controlled with medication. Reports mild ostomy output, no gas. Denies nausea/vomiting.        MEDICATIONS  (STANDING):  acetaminophen     Tablet .. 1000 milliGRAM(s) Oral every 6 hours  heparin   Injectable 5000 Unit(s) SubCutaneous every 8 hours  ketorolac   Injectable 15 milliGRAM(s) IV Push every 6 hours  lactated ringers. 1000 milliLiter(s) (95 mL/Hr) IV Continuous <Continuous>    MEDICATIONS  (PRN):  HYDROmorphone  Injectable 0.5 milliGRAM(s) IV Push every 6 hours PRN Breakthrough pain  ondansetron Injectable 4 milliGRAM(s) IV Push every 6 hours PRN Nausea and/or Vomiting  oxyCODONE    IR 2.5 milliGRAM(s) Oral every 6 hours PRN Moderate Pain (4 - 6)  oxyCODONE    IR 5 milliGRAM(s) Oral every 6 hours PRN Severe Pain (7 - 10)      Vital Signs Last 24 Hrs  T(C): 36.9 (09 May 2025 05:04), Max: 37.2 (08 May 2025 15:59)  T(F): 98.5 (09 May 2025 05:04), Max: 98.9 (08 May 2025 15:59)  HR: 76 (09 May 2025 05:04) (56 - 76)  BP: 143/85 (09 May 2025 05:04) (138/76 - 147/86)  BP(mean): 100 (08 May 2025 15:59) (100 - 100)  RR: 17 (09 May 2025 05:04) (16 - 18)  SpO2: 96% (09 May 2025 05:04) (96% - 98%)    Parameters below as of 09 May 2025 05:04  Patient On (Oxygen Delivery Method): room air        PHYSICAL EXAM  General: NAD, resting comfortably  Pulmonary: normal resp effort  Cardiovascular: NSR  Abdominal: soft, nondistended, appropriate incisional tenderness, incisions c/d/i, AMANDA x 1 to self suction with moderate serosang output, ostomy pink and patent with bowel sweat  Genitourinary: freeman with CYU  Extremities: WWP, no clubbing/cyanosis/edema, JPx1 L thigh, minimal SS output  Neuro: A/O x 3, no focal deficits      I&O's Detail    08 May 2025 07:01  -  09 May 2025 07:00  --------------------------------------------------------  IN:    Lactated Ringers: 2280 mL  Total IN: 2280 mL    OUT:    Bulb (mL): 20 mL    Bulb (mL): 390 mL    Colostomy (mL): 130 mL    Indwelling Catheter - Urethral (mL): 425 mL  Total OUT: 965 mL    Total NET: 1315 mL          LABS:                        11.6   9.64  )-----------( 284      ( 09 May 2025 06:05 )             36.2     05-09    140  |  104  |  21  ----------------------------<  89  3.8   |  22  |  0.72    Ca    9.0      09 May 2025 06:05  Phos  2.8     05-09  Mg     1.8     05-09        Urinalysis Basic - ( 09 May 2025 06:05 )    Color: x / Appearance: x / SG: x / pH: x  Gluc: 89 mg/dL / Ketone: x  / Bili: x / Urobili: x   Blood: x / Protein: x / Nitrite: x   Leuk Esterase: x / RBC: x / WBC x   Sq Epi: x / Non Sq Epi: x / Bacteria: x        RADIOLOGY & ADDITIONAL STUDIES:

## 2025-05-09 NOTE — DIETITIAN INITIAL EVALUATION ADULT - PERTINENT MEDS FT
MEDICATIONS  (STANDING):  acetaminophen     Tablet .. 1000 milliGRAM(s) Oral every 6 hours  heparin   Injectable 5000 Unit(s) SubCutaneous every 8 hours  lactated ringers. 1000 milliLiter(s) (95 mL/Hr) IV Continuous <Continuous>    MEDICATIONS  (PRN):  HYDROmorphone  Injectable 0.5 milliGRAM(s) IV Push every 6 hours PRN Breakthrough pain  ondansetron Injectable 4 milliGRAM(s) IV Push every 6 hours PRN Nausea and/or Vomiting  oxyCODONE    IR 2.5 milliGRAM(s) Oral every 6 hours PRN Moderate Pain (4 - 6)  oxyCODONE    IR 5 milliGRAM(s) Oral every 6 hours PRN Severe Pain (7 - 10)

## 2025-05-09 NOTE — PROGRESS NOTE ADULT - ASSESSMENT
59 year old male with PMH of rectal cancer now s/p cystoscopy bilateral stent placement, robotic assisted APR, coccygectomy, end colostomy creation, L gracilis flap recon (5/6).    CLD/mIVF  Pain/nausea control PRN  DVT ppx  Singleton  Plastics recs: shuffle gait, no sitting, no adduction  Ostomy consult  AM labs  Dispo: Outpatient PT

## 2025-05-09 NOTE — DIETITIAN INITIAL EVALUATION ADULT - PERSON TAUGHT/METHOD
discussed typical diet progression and low-fiber diet for when diet advanced per MD orders; explained low-fiber vs. high-fiber foods with examples provided, emphasized small/ frequent meals and high-protein foods with each meal / snack, pt receptive./verbal instruction/written material/patient instructed/support person

## 2025-05-09 NOTE — DIETITIAN INITIAL EVALUATION ADULT - PERTINENT LABORATORY DATA
Chief Complaint   Patient presents with   • Diabetes Mellitus     smbg -5-6xs daily        HPI    Last appt May 2019, here for followup        Current on Trulicity 0.75mg SC weekly, Levemir 58 u at hs, NL 15u TID plus SS   Not needing mealtime as often   Tried Metformin years past, had diarrhea     Much happier on Freestyle [place] but having issue with [place] coverage, needs Rx for 14 day device, provided      BG high today, ate without insulin dose, ate [employer]      FBG under 130, -160 daytime, no serious hypoglycemia.   Adjusting insulin based on BG checks,  Checks QID  Has some excessive thirst and urination, no blurred vision. Seems better   Has symptoms of hypoglycemia with dizziness, very in frequent      Yearly eye exam, was seeing retinal specialist for some DR [facility], now better and seeing [facility], last appt 4 mo ago. Next appt retinal specialist tomorrow   Has neuropathy, on gabapentin 300mg BID. Has some burning and tingling in the feet, still some welling  Has mild CKD, does not see Nephrologist  Sees Podiatry, no ulcers, has some neuropathy on gabapentin with effect     NO Hx of CAD, has SOB, has chronic brochitis, starte don inhalers by Pulm and feeling much better.   General: no fatigue, no fever, no weight loss, no appetite changes   Eye: no pain, no redness, no diplopia, no blurry vision   Resp: no dyspnea, no cough   CV: no chest pain, no palpitations, no pedal edema   GI: no abdominal pain, no diarrhea, no nausea, no vomiting   MSK: no myalgias, no joint pains   Neuro: no numbness or tingling in feet, no confusion, no HA   Urinary: no polyuria   Skin: no rash   Psych: no depression, no anxiety.       Allergies  ALLERGIES:  No Known Allergies    Current Meds     Current Outpatient Medications   Medication Sig Dispense Refill   • insulin detemir (LEVEMIR FLEXTOUCH) 100 UNIT/ML pen-injector Inject into the skin nightly. Indications: inject 58U once daily Prime 2 units first before each  administration.     • predniSONE (DELTASONE) 20 MG tablet Take 2 tablets by mouth daily. 10 tablet 0   • albuterol 108 (90 Base) MCG/ACT inhaler Inhale 2 puffs into the lungs every 4 hours as needed for Shortness of Breath or Wheezing. 1 Inhaler 5   • triamcinolone (ARISTOCORT) 0.1 % ointment Apply topically 2 times daily. 60 g 0   • acetaminophen-codeine (TYLENOL NO.3) 300-30 MG per tablet Take 1 tablet by mouth every 6 hours as needed for Pain.     • rivaroxaban (XARELTO) 10 MG Tab Take 10 mg by mouth daily.     • HYDROcodone-acetaminophen (NORCO) 5-325 MG per tablet Take 1 tablet by mouth every 4 hours.     • budesonide-formoterol (SYMBICORT) 80-4.5 MCG/ACT inhaler Inhale 2 puffs into the lungs 2 times daily.     • Continuous Blood Gluc Sensor ([employer] Wireless GUMARO SENSOR SYSTEM) Misc Use as directed every 14 days 6 each 3   • dulaglutide (TRULICITY) 0.75 MG/0.5ML pen-injector Inject 0.75 mg into the skin 1 day a week. (Patient taking differently: Inject 0.75 mg into the skin 1 day a week. Given by DM MD on Fridays.) 2 mL 4   • Insulin Pen Needle (BD PEN NEEDLE YURIY U/F) 32G X 4 MM Misc Use to inject insulin 6 times daily. Remove needle cover(s) to expose needle before injecting. 600 each 3   • triamcinolone (ARISTOCORT) 0.1 % ointment Apply topically 2 times daily as needed (rash). 80 g 0   • famotidine (PEPCID AC) 10 MG tablet Take 1 tablet by mouth daily as needed (gerd). 30 tablet 3   • atorvastatin (LIPITOR) 10 MG tablet Take 1 tablet by mouth daily. 30 tablet 5   • lisinopril-hydroCHLOROthiazide (PRINZIDE,ZESTORETIC) 20-25 MG per tablet Take 1 tablet by mouth daily. 30 tablet 5   • gabapentin (NEURONTIN) 300 MG capsule take 1 capsule in am and 2 in pm 270 capsule 1   • insulin aspart (NOVOLOG FLEXPEN) 100 UNIT/ML pen-injector Inject 16u at breakfast, 20u at lunch and 18u at dinner plus scale to max of 70u per day (Patient taking differently: Inject  into the skin. Inject 16u at breakfast, 20u at lunch and 18u at  dinner plus scale to max of 70u per day) 75 mL 1   • fluticasone-salmeterol (ADVAIR HFA) 45-21 MCG/ACT inhaler Inhale 2 puffs into the lungs 2 times daily. 12 g 12   • Blood Glucose Monitoring Suppl ([employer] CONTOUR MONITOR) w/Device Kit TEST 3 TIMES DAILY AS DIRECTED     • blood glucose (ITZEL CONTOUR TEST) test strip USE 1 STRIP 3 TIMES DAILY.     • Lancets (MICROLET) Misc USE TO TEST YOUR SUGAR TWICE DAILY AS DIRECTED     • albuterol 108 (90 Base) MCG/ACT inhaler Inhale 2 puffs into the lungs every 4 hours as needed for Shortness of Breath or Wheezing. 1 Inhaler 0   • azithromycin (ZITHROMAX) 250 MG tablet 2 tablets today then one everyday for 4 more days 6 tablet 0     No current facility-administered medications for this visit.        Active Problems  Patient Active Problem List   Diagnosis   • Administrative encounter   • Anemia   • Chronic hand pain   • CKD (chronic kidney disease) stage 3, GFR 30-59 ml/min (CMS/HCC)   • Type 2 diabetes mellitus (CMS/HCC)   • Diabetic neuropathy associated with type 2 diabetes mellitus (CMS/HCC)   • Diabetic retinopathy, nonproliferative, mild (CMS/HCC)   • Dizziness   • Dysfunctional uterine bleeding   • Elevated alkaline phosphatase level   • Endometrial hyperplasia   • Esophageal reflux   • Essential (primary) hypertension   • Fibroids   • GERD (gastroesophageal reflux disease)   • Hyperlipidemia, unspecified   • Need for immunization against influenza   • Numbness and tingling   • Uncontrolled diabetes mellitus with ophthalmic complication (CMS/HCC)   • Uncontrolled type 2 diabetes with renal manifestation (CMS/HCC)   • Vitamin D deficiency   • Carpal tunnel syndrome of right wrist   • Osteoarthritis of left knee   • Spondylosis of cervical region without myelopathy or radiculopathy       Past Medical History  Past Medical History:   Diagnosis Date   • Arthritis    • Bronchitis, chronic (CMS/HCC)    • Diabetes mellitus (CMS/HCC)    • Essential (primary) hypertension         Family History  Family History   Problem Relation Age of Onset   • Asthma Maternal Grandmother        Review  Past medical history, problem list, family medical history, surgical history and social history reviewed.      Vitals  Visit Vitals  /78   Pulse 70   Ht 5' 3\" (1.6 m)   Wt 88.3 kg (194 lb 10.7 oz)   BMI 34.48 kg/m²       Physical Exam  Constitutional: General: No apparent distress, well appearing,   No pallor, icterus  HEENT: EOMI, no proptosis, No thyromegaly, no nodules  CHEST: CTA, no crackles or rhonchi  CVS: S1 S2, no murmur  Abd: Soft, non tender  Ext: no edema  CNS: Conscious, alert, oriented  Speech intact, reflexes wnl  Skin: No rash       Hemoglobin A1C (%)   Date Value   06/19/2019 6.9 (H)     Creatinine (mg/dL)   Date Value   06/19/2019 1.17 (H)     No results found for: MICALB  CHOLESTEROL (mg/dL)   Date Value   06/19/2019 139     HDL (mg/dL)   Date Value   06/19/2019 51     TRIGLYCERIDE (mg/dL)   Date Value   06/19/2019 124     CALCULATED LDL (mg/dL)   Date Value   06/19/2019 63     TSH (mcUnits/mL)   Date Value   06/19/2019 1.075       Assessment  Patient Active Problem List    Diagnosis Date Noted   • Carpal tunnel syndrome of right wrist 02/18/2019     Priority: Low   • Osteoarthritis of left knee 02/18/2019     Priority: Low   • Spondylosis of cervical region without myelopathy or radiculopathy 02/18/2019     Priority: Low   • Administrative encounter 09/11/2018     Priority: Low   • Chronic hand pain 07/12/2018     Priority: Low   • Endometrial hyperplasia 05/14/2018     Priority: Low   • Fibroids 05/08/2018     Priority: Low   • Elevated alkaline phosphatase level 04/30/2018     Priority: Low   • Anemia 03/28/2018     Priority: Low   • GERD (gastroesophageal reflux disease) 03/28/2018     Priority: Low   • Dysfunctional uterine bleeding 03/14/2018     Priority: Low   • Diabetic retinopathy, nonproliferative, mild (CMS/HCC) 11/19/2017     Priority: Low   • CKD (chronic kidney  disease) stage 3, GFR 30-59 ml/min (CMS/HCC) 08/08/2017     Priority: Low   • Numbness and tingling 08/08/2017     Priority: Low   • Uncontrolled diabetes mellitus with ophthalmic complication (CMS/HCC) 04/10/2017     Priority: Low   • Vitamin D deficiency 08/29/2014     Priority: Low   • Diabetic neuropathy associated with type 2 diabetes mellitus (CMS/HCC) 08/01/2014     Priority: Low   • Dizziness 03/19/2013     Priority: Low   • Hyperlipidemia, unspecified 04/03/2012     Priority: Low   • Need for immunization against influenza 09/14/2011     Priority: Low   • Esophageal reflux 01/11/2011     Priority: Low   • Uncontrolled type 2 diabetes with renal manifestation (CMS/HCC) 01/11/2011     Priority: Low   • Type 2 diabetes mellitus (CMS/HCC) 03/24/2010     Priority: Low   • Essential (primary) hypertension 02/12/2010     Priority: Low       Discussion/Summary  DM Impression:    T2DM  -Improved on trulicity, tolerating without side effects  -Increase Trulicity to 1.5 mg q weekly  -Can adjust to Levemir 50u and decrease further to 45u as needed  -Decrease NL 12u TID  -Avoid correcting for immediate post meal rise, better to give insulin beforehand, can correct for above 180 2 hours after meal  -Diet reviewed, saw Dietitian previously  -Yearly eye exam   -Complications reviewed, has neuropathy on gabapentin, 300 mgam and 600mg at bedtime, advised higher risk of side effects, to monitor   -Doing much better with Freestyle [place]     HTN  -BP to target on ACE, has some protein but seemed to have infection at that time  -ACR negative  Feb 2019  -Creat 1.18, monitor      Dyslipidemia  ON atorvastatin 10mg po qhs   LDL March 2018 54     RTC In 3 mo.   A1C prior to appt     I thank  for letting me participate in the care of this patient. Please do not hesitate to contact me if you have any questions about this patient.          05-09    140  |  104  |  21  ----------------------------<  89  3.8   |  22  |  0.72    Ca    9.0      09 May 2025 06:05  Phos  2.8     05-09  Mg     1.8     05-09

## 2025-05-09 NOTE — DIETITIAN INITIAL EVALUATION ADULT - ADD RECOMMEND
1. Continue to advance diet as tolerated to low-fiber diet as medically feasible per MD orders  ** If unable to advance diet / pt unable to tolerate PO intake, consider initiating nutrition support, please reconsult RD for nutrition support recommendations PRN   2. As feasible, encourage and monitor PO intake, honor preferences as able   >> As feasible, consistently meet >75% of estimated needs during admission   >> Once diet advanced, consider oral nutrition supplement should intake decline </= 50%   3. Monitor wt trends, GI function, skin integrity  4. Monitor lytes, renal indices, blood glucose, LFTs    5. Pain and bowel regimen per team   6. Consider adding MVI for general nutrient coverage as medically feasible   RD will continue to monitor PO intake, labs, hydration, and wt prn.

## 2025-05-09 NOTE — PROGRESS NOTE ADULT - SUBJECTIVE AND OBJECTIVE BOX
Plastic Surgery Progress Note (pg [facility]: [id number], [facility]: [phone])    SUBJECTIVE  The patient was seen and examined. No acute events overnight.    OBJECTIVE  ___________________________________________________  VITAL SIGNS / I&O's   Vital Signs Last 24 Hrs  T(C): 36.9 (09 May 2025 05:04), Max: 37.2 (08 May 2025 15:59)  T(F): 98.5 (09 May 2025 05:04), Max: 98.9 (08 May 2025 15:59)  HR: 76 (09 May 2025 05:04) (56 - 76)  BP: 143/85 (09 May 2025 05:04) (138/76 - 147/86)  BP(mean): 100 (08 May 2025 15:59) (100 - 100)  RR: 17 (09 May 2025 05:04) (16 - 18)  SpO2: 96% (09 May 2025 05:04) (96% - 98%)    Parameters below as of 09 May 2025 05:04  Patient On (Oxygen Delivery Method): room air          08 May 2025 07:01  -  09 May 2025 07:00  --------------------------------------------------------  IN:    Lactated Ringers: 2280 mL  Total IN: 2280 mL    OUT:    Bulb (mL): 20 mL    Bulb (mL): 390 mL    Colostomy (mL): 130 mL    Indwelling Catheter - Urethral (mL): 425 mL  Total OUT: 965 mL    Total NET: 1315 mL        ___________________________________________________  PHYSICAL EXAM    -- CONSTITUTIONAL: NAD, lying in bed  -- NEURO: Awake, alert  sacrum - incision cdi  LE L thigh - dressing cdi  drain ss  ___________________________________________________  LABS                        11.6   9.64  )-----------( 284      ( 09 May 2025 06:05 )             36.2     09 May 2025 06:05    140    |  104    |  21     ----------------------------<  89     3.8     |  22     |  0.72     Ca    9.0        09 May 2025 06:05  Phos  2.8       09 May 2025 06:05  Mg     1.8       09 May 2025 06:05        CAPILLARY BLOOD GLUCOSE            Urinalysis Basic - ( 09 May 2025 06:05 )    Color: x / Appearance: x / SG: x / pH: x  Gluc: 89 mg/dL / Ketone: x  / Bili: x / Urobili: x   Blood: x / Protein: x / Nitrite: x   Leuk Esterase: x / RBC: x / WBC x   Sq Epi: x / Non Sq Epi: x / Bacteria: x      ___________________________________________________  MICRO  Recent Cultures:  Specimen Source: Urine #1 Urine Culture, 05-06 @ 08:55; Results   No growth; Gram Stain: --; Organism: --    ___________________________________________________  MEDICATIONS  (STANDING):  acetaminophen     Tablet .. 1000 milliGRAM(s) Oral every 6 hours  heparin   Injectable 5000 Unit(s) SubCutaneous every 8 hours  ketorolac   Injectable 15 milliGRAM(s) IV Push every 6 hours  lactated ringers. 1000 milliLiter(s) (95 mL/Hr) IV Continuous <Continuous>    MEDICATIONS  (PRN):  HYDROmorphone  Injectable 0.5 milliGRAM(s) IV Push every 6 hours PRN Breakthrough pain  ondansetron Injectable 4 milliGRAM(s) IV Push every 6 hours PRN Nausea and/or Vomiting  oxyCODONE    IR 2.5 milliGRAM(s) Oral every 6 hours PRN Moderate Pain (4 - 6)  oxyCODONE    IR 5 milliGRAM(s) Oral every 6 hours PRN Severe Pain (7 - 10)

## 2025-05-09 NOTE — DIETITIAN INITIAL EVALUATION ADULT - OTHER CALCULATIONS
Based on dosing wt 121 pounds as pt within % IBW (85%). Needs adjusted for post-op healing, hx cancer status post chemo, age.

## 2025-05-09 NOTE — DIETITIAN INITIAL EVALUATION ADULT - HEIGHT FOR BMI (INCHES)
The physician has confirmed that the patient has been reassessed and is appropriate for moderate sedation
6
4

## 2025-05-10 LAB
ANION GAP SERPL CALC-SCNC: 16 MMOL/L — SIGNIFICANT CHANGE UP (ref 5–17)
BUN SERPL-MCNC: 19 MG/DL — SIGNIFICANT CHANGE UP (ref 7–23)
CALCIUM SERPL-MCNC: 8.8 MG/DL — SIGNIFICANT CHANGE UP (ref 8.4–10.5)
CHLORIDE SERPL-SCNC: 102 MMOL/L — SIGNIFICANT CHANGE UP (ref 96–108)
CO2 SERPL-SCNC: 20 MMOL/L — LOW (ref 22–31)
CREAT SERPL-MCNC: 0.64 MG/DL — SIGNIFICANT CHANGE UP (ref 0.5–1.3)
EGFR: 110 ML/MIN/1.73M2 — SIGNIFICANT CHANGE UP
EGFR: 110 ML/MIN/1.73M2 — SIGNIFICANT CHANGE UP
GLUCOSE SERPL-MCNC: 81 MG/DL — SIGNIFICANT CHANGE UP (ref 70–99)
HCT VFR BLD CALC: 35.1 % — LOW (ref 39–50)
HGB BLD-MCNC: 11.5 G/DL — LOW (ref 13–17)
MAGNESIUM SERPL-MCNC: 1.7 MG/DL — SIGNIFICANT CHANGE UP (ref 1.6–2.6)
MCHC RBC-ENTMCNC: 29.7 PG — SIGNIFICANT CHANGE UP (ref 27–34)
MCHC RBC-ENTMCNC: 32.8 G/DL — SIGNIFICANT CHANGE UP (ref 32–36)
MCV RBC AUTO: 90.7 FL — SIGNIFICANT CHANGE UP (ref 80–100)
NRBC BLD AUTO-RTO: 0 /100 WBCS — SIGNIFICANT CHANGE UP (ref 0–0)
PHOSPHATE SERPL-MCNC: 2.9 MG/DL — SIGNIFICANT CHANGE UP (ref 2.5–4.5)
PLATELET # BLD AUTO: 301 K/UL — SIGNIFICANT CHANGE UP (ref 150–400)
POTASSIUM SERPL-MCNC: 3.8 MMOL/L — SIGNIFICANT CHANGE UP (ref 3.5–5.3)
POTASSIUM SERPL-SCNC: 3.8 MMOL/L — SIGNIFICANT CHANGE UP (ref 3.5–5.3)
RBC # BLD: 3.87 M/UL — LOW (ref 4.2–5.8)
RBC # FLD: 14.7 % — HIGH (ref 10.3–14.5)
SODIUM SERPL-SCNC: 138 MMOL/L — SIGNIFICANT CHANGE UP (ref 135–145)
WBC # BLD: 9.16 K/UL — SIGNIFICANT CHANGE UP (ref 3.8–10.5)
WBC # FLD AUTO: 9.16 K/UL — SIGNIFICANT CHANGE UP (ref 3.8–10.5)

## 2025-05-10 PROCEDURE — 74019 RADEX ABDOMEN 2 VIEWS: CPT | Mod: 26

## 2025-05-10 RX ORDER — ACETAMINOPHEN 500 MG/5ML
1000 LIQUID (ML) ORAL EVERY 6 HOURS
Refills: 0 | Status: COMPLETED | OUTPATIENT
Start: 2025-05-10 | End: 2025-05-11

## 2025-05-10 RX ORDER — MAGNESIUM SULFATE 500 MG/ML
1 SYRINGE (ML) INJECTION ONCE
Refills: 0 | Status: COMPLETED | OUTPATIENT
Start: 2025-05-10 | End: 2025-05-10

## 2025-05-10 RX ORDER — POTASSIUM PHOSPHATE, MONOBASIC POTASSIUM PHOSPHATE, DIBASIC INJECTION, 236; 224 MG/ML; MG/ML
15 SOLUTION, CONCENTRATE INTRAVENOUS ONCE
Refills: 0 | Status: COMPLETED | OUTPATIENT
Start: 2025-05-10 | End: 2025-05-10

## 2025-05-10 RX ADMIN — Medication 100 GRAM(S): at 10:49

## 2025-05-10 RX ADMIN — Medication 400 MILLIGRAM(S): at 06:11

## 2025-05-10 RX ADMIN — SODIUM CHLORIDE 95 MILLILITER(S): 9 INJECTION, SOLUTION INTRAVENOUS at 00:50

## 2025-05-10 RX ADMIN — HEPARIN SODIUM 5000 UNIT(S): 1000 INJECTION INTRAVENOUS; SUBCUTANEOUS at 22:31

## 2025-05-10 RX ADMIN — Medication 4 MILLIGRAM(S): at 01:41

## 2025-05-10 RX ADMIN — HEPARIN SODIUM 5000 UNIT(S): 1000 INJECTION INTRAVENOUS; SUBCUTANEOUS at 13:46

## 2025-05-10 RX ADMIN — POTASSIUM PHOSPHATE, MONOBASIC POTASSIUM PHOSPHATE, DIBASIC INJECTION, 62.5 MILLIMOLE(S): 236; 224 SOLUTION, CONCENTRATE INTRAVENOUS at 13:32

## 2025-05-10 RX ADMIN — Medication 400 MILLIGRAM(S): at 17:39

## 2025-05-10 RX ADMIN — SODIUM CHLORIDE 95 MILLILITER(S): 9 INJECTION, SOLUTION INTRAVENOUS at 22:44

## 2025-05-10 RX ADMIN — HEPARIN SODIUM 5000 UNIT(S): 1000 INJECTION INTRAVENOUS; SUBCUTANEOUS at 06:12

## 2025-05-10 RX ADMIN — Medication 400 MILLIGRAM(S): at 11:57

## 2025-05-10 NOTE — PROGRESS NOTE ADULT - SUBJECTIVE AND OBJECTIVE BOX
INTERVAL HPI/OVERNIGHT EVENTS:    STATUS POST:      POST OPERATIVE DAY #:     SUBJECTIVE:  Patient seen and examined with chief resident on morning rounds.    MEDICATIONS  (STANDING):  acetaminophen   IVPB .. 1000 milliGRAM(s) IV Intermittent every 6 hours  heparin   Injectable 5000 Unit(s) SubCutaneous every 8 hours  lactated ringers. 1000 milliLiter(s) (95 mL/Hr) IV Continuous <Continuous>    MEDICATIONS  (PRN):  HYDROmorphone  Injectable 0.5 milliGRAM(s) IV Push every 6 hours PRN Breakthrough pain  ondansetron Injectable 4 milliGRAM(s) IV Push every 6 hours PRN Nausea and/or Vomiting  oxyCODONE    IR 2.5 milliGRAM(s) Oral every 6 hours PRN Moderate Pain (4 - 6)  oxyCODONE    IR 5 milliGRAM(s) Oral every 6 hours PRN Severe Pain (7 - 10)      Vital Signs Last 24 Hrs  T(C): 36.8 (10 May 2025 04:51), Max: 37.1 (09 May 2025 20:02)  T(F): 98.2 (10 May 2025 04:51), Max: 98.7 (09 May 2025 20:02)  HR: 64 (10 May 2025 04:51) (56 - 64)  BP: 145/79 (10 May 2025 04:51) (138/77 - 156/74)  BP(mean): 101 (10 May 2025 04:51) (101 - 101)  RR: 17 (10 May 2025 04:51) (17 - 18)  SpO2: 97% (10 May 2025 04:51) (97% - 99%)    Parameters below as of 10 May 2025 04:51  Patient On (Oxygen Delivery Method): room air        Physical exam:  General Appearance: Appears well, NAD  Pulmonary: Nonlabored breathing, no respiratory distress  HEENT: NTAC  Abdomen: Soft, nondistended, nontender  Extremities: WWP, SCD's in place     I&O's Detail    09 May 2025 07:01  -  10 May 2025 07:00  --------------------------------------------------------  IN:    IV PiggyBack: 100 mL    Lactated Ringers: 2090 mL    Oral Fluid: 775 mL  Total IN: 2965 mL    OUT:    Bulb (mL): 5 mL    Bulb (mL): 250 mL    Colostomy (mL): 195 mL    Emesis (mL): 300 mL    Indwelling Catheter - Urethral (mL): 1050 mL  Total OUT: 1800 mL    Total NET: 1165 mL          LABS:                        11.5   9.16  )-----------( 301      ( 10 May 2025 06:17 )             35.1     05-10    138  |  102  |  19  ----------------------------<  81  3.8   |  20[L]  |  0.64    Ca    8.8      10 May 2025 06:17  Phos  2.9     05-10  Mg     1.7     05-10        Urinalysis Basic - ( 10 May 2025 06:17 )    Color: x / Appearance: x / SG: x / pH: x  Gluc: 81 mg/dL / Ketone: x  / Bili: x / Urobili: x   Blood: x / Protein: x / Nitrite: x   Leuk Esterase: x / RBC: x / WBC x   Sq Epi: x / Non Sq Epi: x / Bacteria: x        RADIOLOGY & ADDITIONAL STUDIES: INTERVAL HPI/OVERNIGHT EVENTS: 300cc bilious emesis after drinking, made NPO, pain controlled, ostomy with no gas    STATUS POST:  p cystoscopy bilateral stent placement, robotic assisted APR, coccygectomy, end colostomy creation, L gracilis flap recon     POST OPERATIVE DAY #: 4    SUBJECTIVE:  Patient seen and examined with chief resident on morning rounds. Reports that his pain is controlled. Nausea is slightly improved from last night. Denies any more vomiting. Has had minimal gas or output from his ostomy.    MEDICATIONS  (STANDING):  acetaminophen   IVPB .. 1000 milliGRAM(s) IV Intermittent every 6 hours  heparin   Injectable 5000 Unit(s) SubCutaneous every 8 hours  lactated ringers. 1000 milliLiter(s) (95 mL/Hr) IV Continuous <Continuous>    MEDICATIONS  (PRN):  HYDROmorphone  Injectable 0.5 milliGRAM(s) IV Push every 6 hours PRN Breakthrough pain  ondansetron Injectable 4 milliGRAM(s) IV Push every 6 hours PRN Nausea and/or Vomiting  oxyCODONE    IR 2.5 milliGRAM(s) Oral every 6 hours PRN Moderate Pain (4 - 6)  oxyCODONE    IR 5 milliGRAM(s) Oral every 6 hours PRN Severe Pain (7 - 10)      Vital Signs Last 24 Hrs  T(C): 36.8 (10 May 2025 04:51), Max: 37.1 (09 May 2025 20:02)  T(F): 98.2 (10 May 2025 04:51), Max: 98.7 (09 May 2025 20:02)  HR: 64 (10 May 2025 04:51) (56 - 64)  BP: 145/79 (10 May 2025 04:51) (138/77 - 156/74)  BP(mean): 101 (10 May 2025 04:51) (101 - 101)  RR: 17 (10 May 2025 04:51) (17 - 18)  SpO2: 97% (10 May 2025 04:51) (97% - 99%)    Parameters below as of 10 May 2025 04:51  Patient On (Oxygen Delivery Method): room air        Physical exam:  General Appearance: Appears well, NAD  Pulmonary: Nonlabored breathing, no respiratory distress  HEENT: NTAC  Abdomen: Soft, mildly distended, nontender. Ostomy with minimal bowel sweat and no gas.  Extremities: WWP, SCD's in place     I&O's Detail    09 May 2025 07:01  -  10 May 2025 07:00  --------------------------------------------------------  IN:    IV PiggyBack: 100 mL    Lactated Ringers: 2090 mL    Oral Fluid: 775 mL  Total IN: 2965 mL    OUT:    Bulb (mL): 5 mL    Bulb (mL): 250 mL    Colostomy (mL): 195 mL    Emesis (mL): 300 mL    Indwelling Catheter - Urethral (mL): 1050 mL  Total OUT: 1800 mL    Total NET: 1165 mL          LABS:                        11.5   9.16  )-----------( 301      ( 10 May 2025 06:17 )             35.1     05-10    138  |  102  |  19  ----------------------------<  81  3.8   |  20[L]  |  0.64    Ca    8.8      10 May 2025 06:17  Phos  2.9     05-10  Mg     1.7     05-10        Urinalysis Basic - ( 10 May 2025 06:17 )    Color: x / Appearance: x / SG: x / pH: x  Gluc: 81 mg/dL / Ketone: x  / Bili: x / Urobili: x   Blood: x / Protein: x / Nitrite: x   Leuk Esterase: x / RBC: x / WBC x   Sq Epi: x / Non Sq Epi: x / Bacteria: x

## 2025-05-10 NOTE — PROGRESS NOTE ADULT - ASSESSMENT
59 year old male with PMH of rectal cancer now s/p cystoscopy bilateral stent placement, robotic assisted APR, coccygectomy, end colostomy creation, L gracilis flap recon (5/6).    NPO/mIVF  Pain/nausea control PRN  DVT ppx  [facility]  Plastics recs: shuffle gait, no sitting, no adduction  Ostomy consult  AM labs  Dispo: Outpatient PT

## 2025-05-11 LAB
ANION GAP SERPL CALC-SCNC: 17 MMOL/L — SIGNIFICANT CHANGE UP (ref 5–17)
BUN SERPL-MCNC: 15 MG/DL — SIGNIFICANT CHANGE UP (ref 7–23)
CALCIUM SERPL-MCNC: 8.6 MG/DL — SIGNIFICANT CHANGE UP (ref 8.4–10.5)
CHLORIDE SERPL-SCNC: 101 MMOL/L — SIGNIFICANT CHANGE UP (ref 96–108)
CO2 SERPL-SCNC: 17 MMOL/L — LOW (ref 22–31)
CREAT SERPL-MCNC: 0.63 MG/DL — SIGNIFICANT CHANGE UP (ref 0.5–1.3)
EGFR: 110 ML/MIN/1.73M2 — SIGNIFICANT CHANGE UP
EGFR: 110 ML/MIN/1.73M2 — SIGNIFICANT CHANGE UP
GLUCOSE SERPL-MCNC: 72 MG/DL — SIGNIFICANT CHANGE UP (ref 70–99)
HCT VFR BLD CALC: 37.3 % — LOW (ref 39–50)
HGB BLD-MCNC: 11.9 G/DL — LOW (ref 13–17)
MAGNESIUM SERPL-MCNC: 1.7 MG/DL — SIGNIFICANT CHANGE UP (ref 1.6–2.6)
MCHC RBC-ENTMCNC: 28.7 PG — SIGNIFICANT CHANGE UP (ref 27–34)
MCHC RBC-ENTMCNC: 31.9 G/DL — LOW (ref 32–36)
MCV RBC AUTO: 89.9 FL — SIGNIFICANT CHANGE UP (ref 80–100)
NRBC BLD AUTO-RTO: 0 /100 WBCS — SIGNIFICANT CHANGE UP (ref 0–0)
PHOSPHATE SERPL-MCNC: 3 MG/DL — SIGNIFICANT CHANGE UP (ref 2.5–4.5)
PLATELET # BLD AUTO: 303 K/UL — SIGNIFICANT CHANGE UP (ref 150–400)
POTASSIUM SERPL-MCNC: 4.1 MMOL/L — SIGNIFICANT CHANGE UP (ref 3.5–5.3)
POTASSIUM SERPL-SCNC: 4.1 MMOL/L — SIGNIFICANT CHANGE UP (ref 3.5–5.3)
RBC # BLD: 4.15 M/UL — LOW (ref 4.2–5.8)
RBC # FLD: 14.4 % — SIGNIFICANT CHANGE UP (ref 10.3–14.5)
SODIUM SERPL-SCNC: 135 MMOL/L — SIGNIFICANT CHANGE UP (ref 135–145)
WBC # BLD: 7.87 K/UL — SIGNIFICANT CHANGE UP (ref 3.8–10.5)
WBC # FLD AUTO: 7.87 K/UL — SIGNIFICANT CHANGE UP (ref 3.8–10.5)

## 2025-05-11 PROCEDURE — 71045 X-RAY EXAM CHEST 1 VIEW: CPT | Mod: 26,76

## 2025-05-11 PROCEDURE — 74177 CT ABD & PELVIS W/CONTRAST: CPT | Mod: 26

## 2025-05-11 RX ORDER — HYDROMORPHONE/SOD CHLOR,ISO/PF 2 MG/10 ML
0.25 SYRINGE (ML) INJECTION ONCE
Refills: 0 | Status: DISCONTINUED | OUTPATIENT
Start: 2025-05-11 | End: 2025-05-11

## 2025-05-11 RX ORDER — ACETAMINOPHEN 500 MG/5ML
1000 LIQUID (ML) ORAL EVERY 6 HOURS
Refills: 0 | Status: COMPLETED | OUTPATIENT
Start: 2025-05-11 | End: 2025-05-12

## 2025-05-11 RX ORDER — DIATRIZOATE MEGLUMINE, SODIUM 66 %-10 %
30 VIAL (ML) INJECTION ONCE
Refills: 0 | Status: COMPLETED | OUTPATIENT
Start: 2025-05-11 | End: 2025-05-11

## 2025-05-11 RX ORDER — MAGNESIUM SULFATE 500 MG/ML
1 SYRINGE (ML) INJECTION ONCE
Refills: 0 | Status: COMPLETED | OUTPATIENT
Start: 2025-05-11 | End: 2025-05-11

## 2025-05-11 RX ORDER — SODIUM CHLORIDE 9 G/1000ML
1000 INJECTION, SOLUTION INTRAVENOUS ONCE
Refills: 0 | Status: COMPLETED | OUTPATIENT
Start: 2025-05-11 | End: 2025-05-11

## 2025-05-11 RX ORDER — POTASSIUM CHLORIDE, DEXTROSE MONOHYDRATE AND SODIUM CHLORIDE 150; 5; 900 MG/100ML; G/100ML; MG/100ML
1000 INJECTION, SOLUTION INTRAVENOUS
Refills: 0 | Status: DISCONTINUED | OUTPATIENT
Start: 2025-05-11 | End: 2025-05-15

## 2025-05-11 RX ORDER — SODIUM CHLORIDE 9 G/1000ML
1000 INJECTION, SOLUTION INTRAVENOUS ONCE
Refills: 0 | Status: DISCONTINUED | OUTPATIENT
Start: 2025-05-11 | End: 2025-05-11

## 2025-05-11 RX ADMIN — POTASSIUM CHLORIDE, DEXTROSE MONOHYDRATE AND SODIUM CHLORIDE 100 MILLILITER(S): 150; 5; 900 INJECTION, SOLUTION INTRAVENOUS at 08:06

## 2025-05-11 RX ADMIN — Medication 40 MILLIGRAM(S): at 10:34

## 2025-05-11 RX ADMIN — HEPARIN SODIUM 5000 UNIT(S): 1000 INJECTION INTRAVENOUS; SUBCUTANEOUS at 06:32

## 2025-05-11 RX ADMIN — HEPARIN SODIUM 5000 UNIT(S): 1000 INJECTION INTRAVENOUS; SUBCUTANEOUS at 22:09

## 2025-05-11 RX ADMIN — HEPARIN SODIUM 5000 UNIT(S): 1000 INJECTION INTRAVENOUS; SUBCUTANEOUS at 14:40

## 2025-05-11 RX ADMIN — Medication 400 MILLIGRAM(S): at 23:07

## 2025-05-11 RX ADMIN — Medication 100 GRAM(S): at 10:26

## 2025-05-11 RX ADMIN — Medication 400 MILLIGRAM(S): at 17:09

## 2025-05-11 RX ADMIN — Medication 400 MILLIGRAM(S): at 00:10

## 2025-05-11 RX ADMIN — Medication 30 MILLILITER(S): at 11:10

## 2025-05-11 RX ADMIN — SODIUM CHLORIDE 1000 MILLILITER(S): 9 INJECTION, SOLUTION INTRAVENOUS at 18:31

## 2025-05-11 NOTE — PROGRESS NOTE ADULT - ASSESSMENT
59 year old male with PMH of rectal cancer now s/p cystoscopy bilateral stent placement, robotic assisted APR, coccygectomy, end colostomy creation, L gracilis flap recon (5/6).    NPO/mIVF  Pain/nausea control PRN  DVT ppx  Singleton  Plastics recs: shuffle gait, no sitting, no adduction  Ostomy consult  AM labs  Dispo: Outpatient PT   59 year old male with PMH of rectal cancer now s/p cystoscopy bilateral stent placement, robotic assisted APR, coccygectomy, end colostomy creation, L gracilis flap recon (5/6).    NPO/mIVF  NGT LIWS  PPI  CTAP w/ PO/IV contrast 5/11  Pain/nausea control PRN  DVT ppx  Singleton  Plastics recs: shuffle gait, no sitting, no adduction  Ostomy consult  AM labs  Dispo: Outpatient PT

## 2025-05-11 NOTE — PROGRESS NOTE ADULT - SUBJECTIVE AND OBJECTIVE BOX
STATUS POST:  p cystoscopy bilateral stent placement, robotic assisted APR, coccygectomy, end colostomy creation, L gracilis flap recon     POST OPERATIVE DAY #: 5    SUBJECTIVE:      MEDICATIONS  (STANDING):  dextrose 5% + sodium chloride 0.45% with potassium chloride 20 mEq/L 1000 milliLiter(s) (100 mL/Hr) IV Continuous <Continuous>  heparin   Injectable 5000 Unit(s) SubCutaneous every 8 hours    MEDICATIONS  (PRN):  HYDROmorphone  Injectable 0.5 milliGRAM(s) IV Push every 6 hours PRN Breakthrough pain  ondansetron Injectable 4 milliGRAM(s) IV Push every 6 hours PRN Nausea and/or Vomiting  oxyCODONE    IR 2.5 milliGRAM(s) Oral every 6 hours PRN Moderate Pain (4 - 6)  oxyCODONE    IR 5 milliGRAM(s) Oral every 6 hours PRN Severe Pain (7 - 10)      Vital Signs Last 24 Hrs  T(C): 36.6 (11 May 2025 05:38), Max: 36.8 (10 May 2025 08:18)  T(F): 97.9 (11 May 2025 05:38), Max: 98.3 (10 May 2025 08:18)  HR: 67 (11 May 2025 05:38) (62 - 69)  BP: 148/87 (11 May 2025 05:38) (143/81 - 162/87)  BP(mean): --  RR: 17 (11 May 2025 05:38) (16 - 17)  SpO2: 99% (11 May 2025 05:38) (95% - 99%)    Parameters below as of 11 May 2025 05:38  Patient On (Oxygen Delivery Method): room air        PHYSICAL EXAM  General: NAD, resting comfortably  Pulmonary: normal resp effort  Cardiovascular: NSR  Abdominal: Soft, mildly distended, nontender. Ostomy with minimal bowel sweat and no gas.  Extremities: WWP, no clubbing/cyanosis/edema  Neuro: A/O x 3, no focal deficits    I&O's Detail    09 May 2025 07:01  -  10 May 2025 07:00  --------------------------------------------------------  IN:    IV PiggyBack: 100 mL    Lactated Ringers: 2185 mL    Oral Fluid: 775 mL  Total IN: 3060 mL    OUT:    Bulb (mL): 5 mL    Bulb (mL): 250 mL    Colostomy (mL): 195 mL    Emesis (mL): 300 mL    Indwelling Catheter - Urethral (mL): 1050 mL  Total OUT: 1800 mL    Total NET: 1260 mL      10 May 2025 07:01  -  11 May 2025 06:30  --------------------------------------------------------  IN:    Lactated Ringers: 1045 mL  Total IN: 1045 mL    OUT:    Bulb (mL): 170 mL    Colostomy (mL): 75 mL    Indwelling Catheter - Urethral (mL): 1300 mL  Total OUT: 1545 mL    Total NET: -500 mL          LABS:                        11.5   9.16  )-----------( 301      ( 10 May 2025 06:17 )             35.1     05-10    138  |  102  |  19  ----------------------------<  81  3.8   |  20[L]  |  0.64    Ca    8.8      10 May 2025 06:17  Phos  2.9     05-10  Mg     1.7     05-10        Urinalysis Basic - ( 10 May 2025 06:17 )    Color: x / Appearance: x / SG: x / pH: x  Gluc: 81 mg/dL / Ketone: x  / Bili: x / Urobili: x   Blood: x / Protein: x / Nitrite: x   Leuk Esterase: x / RBC: x / WBC x   Sq Epi: x / Non Sq Epi: x / Bacteria: x        RADIOLOGY & ADDITIONAL STUDIES: STATUS POST:  p cystoscopy bilateral stent placement, robotic assisted APR, coccygectomy, end colostomy creation, L gracilis flap recon     POST OPERATIVE DAY #: 5    SUBJECTIVE: Patient seen and examined. Pain well controlled, getting OOBA. Remains nauseous. Minimal ostomy output.       MEDICATIONS  (STANDING):  dextrose 5% + sodium chloride 0.45% with potassium chloride 20 mEq/L 1000 milliLiter(s) (100 mL/Hr) IV Continuous <Continuous>  heparin   Injectable 5000 Unit(s) SubCutaneous every 8 hours    MEDICATIONS  (PRN):  HYDROmorphone  Injectable 0.5 milliGRAM(s) IV Push every 6 hours PRN Breakthrough pain  ondansetron Injectable 4 milliGRAM(s) IV Push every 6 hours PRN Nausea and/or Vomiting  oxyCODONE    IR 2.5 milliGRAM(s) Oral every 6 hours PRN Moderate Pain (4 - 6)  oxyCODONE    IR 5 milliGRAM(s) Oral every 6 hours PRN Severe Pain (7 - 10)      Vital Signs Last 24 Hrs  T(C): 36.6 (11 May 2025 05:38), Max: 36.8 (10 May 2025 08:18)  T(F): 97.9 (11 May 2025 05:38), Max: 98.3 (10 May 2025 08:18)  HR: 67 (11 May 2025 05:38) (62 - 69)  BP: 148/87 (11 May 2025 05:38) (143/81 - 162/87)  BP(mean): --  RR: 17 (11 May 2025 05:38) (16 - 17)  SpO2: 99% (11 May 2025 05:38) (95% - 99%)    Parameters below as of 11 May 2025 05:38  Patient On (Oxygen Delivery Method): room air        PHYSICAL EXAM  General: NAD, resting comfortably  Pulmonary: normal resp effort  Cardiovascular: NSR  Abdominal: Soft, mildly distended, nontender. Ostomy with minimal bowel sweat and no gas.  Extremities: WWP, no clubbing/cyanosis/edema  Neuro: A/O x 3, no focal deficits    I&O's Detail    09 May 2025 07:01  -  10 May 2025 07:00  --------------------------------------------------------  IN:    IV PiggyBack: 100 mL    Lactated Ringers: 2185 mL    Oral Fluid: 775 mL  Total IN: 3060 mL    OUT:    Bulb (mL): 5 mL    Bulb (mL): 250 mL    Colostomy (mL): 195 mL    Emesis (mL): 300 mL    Indwelling Catheter - Urethral (mL): 1050 mL  Total OUT: 1800 mL    Total NET: 1260 mL      10 May 2025 07:01  -  11 May 2025 06:30  --------------------------------------------------------  IN:    Lactated Ringers: 1045 mL  Total IN: 1045 mL    OUT:    Bulb (mL): 170 mL    Colostomy (mL): 75 mL    Indwelling Catheter - Urethral (mL): 1300 mL  Total OUT: 1545 mL    Total NET: -500 mL          LABS:                        11.5   9.16  )-----------( 301      ( 10 May 2025 06:17 )             35.1     05-10    138  |  102  |  19  ----------------------------<  81  3.8   |  20[L]  |  0.64    Ca    8.8      10 May 2025 06:17  Phos  2.9     05-10  Mg     1.7     05-10        Urinalysis Basic - ( 10 May 2025 06:17 )    Color: x / Appearance: x / SG: x / pH: x  Gluc: 81 mg/dL / Ketone: x  / Bili: x / Urobili: x   Blood: x / Protein: x / Nitrite: x   Leuk Esterase: x / RBC: x / WBC x   Sq Epi: x / Non Sq Epi: x / Bacteria: x        RADIOLOGY & ADDITIONAL STUDIES:

## 2025-05-12 PROBLEM — C20 MALIGNANT NEOPLASM OF RECTUM: Chronic | Status: ACTIVE | Noted: 2025-01-29

## 2025-05-12 LAB
ANION GAP SERPL CALC-SCNC: 10 MMOL/L — SIGNIFICANT CHANGE UP (ref 5–17)
BUN SERPL-MCNC: 10 MG/DL — SIGNIFICANT CHANGE UP (ref 7–23)
CALCIUM SERPL-MCNC: 8.2 MG/DL — LOW (ref 8.4–10.5)
CHLORIDE SERPL-SCNC: 103 MMOL/L — SIGNIFICANT CHANGE UP (ref 96–108)
CO2 SERPL-SCNC: 26 MMOL/L — SIGNIFICANT CHANGE UP (ref 22–31)
CREAT SERPL-MCNC: 0.67 MG/DL — SIGNIFICANT CHANGE UP (ref 0.5–1.3)
EGFR: 108 ML/MIN/1.73M2 — SIGNIFICANT CHANGE UP
EGFR: 108 ML/MIN/1.73M2 — SIGNIFICANT CHANGE UP
GLUCOSE SERPL-MCNC: 124 MG/DL — HIGH (ref 70–99)
HCT VFR BLD CALC: 34.4 % — LOW (ref 39–50)
HGB BLD-MCNC: 11.4 G/DL — LOW (ref 13–17)
MAGNESIUM SERPL-MCNC: 1.9 MG/DL — SIGNIFICANT CHANGE UP (ref 1.6–2.6)
MCHC RBC-ENTMCNC: 29.6 PG — SIGNIFICANT CHANGE UP (ref 27–34)
MCHC RBC-ENTMCNC: 33.1 G/DL — SIGNIFICANT CHANGE UP (ref 32–36)
MCV RBC AUTO: 89.4 FL — SIGNIFICANT CHANGE UP (ref 80–100)
NRBC BLD AUTO-RTO: 0 /100 WBCS — SIGNIFICANT CHANGE UP (ref 0–0)
PHOSPHATE SERPL-MCNC: 2.4 MG/DL — LOW (ref 2.5–4.5)
PLATELET # BLD AUTO: 277 K/UL — SIGNIFICANT CHANGE UP (ref 150–400)
POTASSIUM SERPL-MCNC: 3.6 MMOL/L — SIGNIFICANT CHANGE UP (ref 3.5–5.3)
POTASSIUM SERPL-SCNC: 3.6 MMOL/L — SIGNIFICANT CHANGE UP (ref 3.5–5.3)
RBC # BLD: 3.85 M/UL — LOW (ref 4.2–5.8)
RBC # FLD: 14.4 % — SIGNIFICANT CHANGE UP (ref 10.3–14.5)
SODIUM SERPL-SCNC: 139 MMOL/L — SIGNIFICANT CHANGE UP (ref 135–145)
WBC # BLD: 7.11 K/UL — SIGNIFICANT CHANGE UP (ref 3.8–10.5)
WBC # FLD AUTO: 7.11 K/UL — SIGNIFICANT CHANGE UP (ref 3.8–10.5)

## 2025-05-12 RX ORDER — ACETAMINOPHEN 500 MG/5ML
1000 LIQUID (ML) ORAL EVERY 6 HOURS
Refills: 0 | Status: COMPLETED | OUTPATIENT
Start: 2025-05-12 | End: 2025-05-13

## 2025-05-12 RX ORDER — MAGNESIUM SULFATE 500 MG/ML
1 SYRINGE (ML) INJECTION ONCE
Refills: 0 | Status: COMPLETED | OUTPATIENT
Start: 2025-05-12 | End: 2025-05-12

## 2025-05-12 RX ORDER — POTASSIUM PHOSPHATE, MONOBASIC POTASSIUM PHOSPHATE, DIBASIC INJECTION, 236; 224 MG/ML; MG/ML
15 SOLUTION, CONCENTRATE INTRAVENOUS ONCE
Refills: 0 | Status: COMPLETED | OUTPATIENT
Start: 2025-05-12 | End: 2025-05-12

## 2025-05-12 RX ADMIN — POTASSIUM PHOSPHATE, MONOBASIC POTASSIUM PHOSPHATE, DIBASIC INJECTION, 62.5 MILLIMOLE(S): 236; 224 SOLUTION, CONCENTRATE INTRAVENOUS at 09:04

## 2025-05-12 RX ADMIN — Medication 400 MILLIGRAM(S): at 06:02

## 2025-05-12 RX ADMIN — POTASSIUM CHLORIDE, DEXTROSE MONOHYDRATE AND SODIUM CHLORIDE 100 MILLILITER(S): 150; 5; 900 INJECTION, SOLUTION INTRAVENOUS at 21:42

## 2025-05-12 RX ADMIN — Medication 40 MILLIGRAM(S): at 11:12

## 2025-05-12 RX ADMIN — HEPARIN SODIUM 5000 UNIT(S): 1000 INJECTION INTRAVENOUS; SUBCUTANEOUS at 21:41

## 2025-05-12 RX ADMIN — HEPARIN SODIUM 5000 UNIT(S): 1000 INJECTION INTRAVENOUS; SUBCUTANEOUS at 06:02

## 2025-05-12 RX ADMIN — HEPARIN SODIUM 5000 UNIT(S): 1000 INJECTION INTRAVENOUS; SUBCUTANEOUS at 13:47

## 2025-05-12 RX ADMIN — POTASSIUM CHLORIDE, DEXTROSE MONOHYDRATE AND SODIUM CHLORIDE 100 MILLILITER(S): 150; 5; 900 INJECTION, SOLUTION INTRAVENOUS at 06:02

## 2025-05-12 RX ADMIN — Medication 400 MILLIGRAM(S): at 11:12

## 2025-05-12 RX ADMIN — Medication 100 GRAM(S): at 08:02

## 2025-05-12 RX ADMIN — Medication 400 MILLIGRAM(S): at 17:38

## 2025-05-12 NOTE — PROGRESS NOTE ADULT - SUBJECTIVE AND OBJECTIVE BOX
STATUS POST:  p cystoscopy bilateral stent placement, robotic assisted APR, coccygectomy, end colostomy creation, L gracilis flap recon     POST OPERATIVE DAY #: 5    SUBJECTIVE:      MEDICATIONS  (STANDING):  acetaminophen   IVPB .. 1000 milliGRAM(s) IV Intermittent every 6 hours  dextrose 5% + sodium chloride 0.45% with potassium chloride 20 mEq/L 1000 milliLiter(s) (100 mL/Hr) IV Continuous <Continuous>  heparin   Injectable 5000 Unit(s) SubCutaneous every 8 hours  pantoprazole  Injectable 40 milliGRAM(s) IV Push daily    MEDICATIONS  (PRN):  HYDROmorphone  Injectable 0.5 milliGRAM(s) IV Push every 6 hours PRN breakthrough pain on the floor  ondansetron Injectable 4 milliGRAM(s) IV Push every 6 hours PRN Nausea and/or Vomiting  oxyCODONE    IR 2.5 milliGRAM(s) Oral every 6 hours PRN Moderate Pain (4 - 6)  oxyCODONE    IR 5 milliGRAM(s) Oral every 6 hours PRN Severe Pain (7 - 10)      Vital Signs Last 24 Hrs  T(C): 36.9 (12 May 2025 04:58), Max: 36.9 (11 May 2025 17:19)  T(F): 98.4 (12 May 2025 04:58), Max: 98.5 (11 May 2025 17:19)  HR: 70 (12 May 2025 04:58) (65 - 70)  BP: 137/82 (12 May 2025 04:58) (137/82 - 158/84)  BP(mean): --  RR: 17 (12 May 2025 04:58) (17 - 18)  SpO2: 96% (12 May 2025 04:58) (96% - 98%)    Parameters below as of 12 May 2025 04:58  Patient On (Oxygen Delivery Method): room air        PHYSICAL EXAM  General: NAD, resting comfortably  Pulmonary: normal resp effort  Cardiovascular: NSR  Abdominal: Soft, mildly distended, nontender. Ostomy with minimal bowel sweat and no gas.  Extremities: WWP, no clubbing/cyanosis/edema  Neuro: A/O x 3, no focal deficits      I&O's Detail    11 May 2025 07:01  -  12 May 2025 07:00  --------------------------------------------------------  IN:    dextrose 5% + sodium chloride 0.45% w/ Additives: 1100 mL  Total IN: 1100 mL    OUT:    Bulb (mL): 215 mL    Colostomy (mL): 10 mL    Indwelling Catheter - Urethral (mL): 1800 mL    Nasogastric/Oral tube (mL): 1950 mL  Total OUT: 3975 mL    Total NET: -2875 mL          LABS:                        11.4   7.11  )-----------( 277      ( 12 May 2025 06:19 )             34.4     05-12    139  |  103  |  10  ----------------------------<  124[H]  3.6   |  26  |  0.67    Ca    8.2[L]      12 May 2025 06:19  Phos  2.4     05-12  Mg     1.9     05-12        Urinalysis Basic - ( 12 May 2025 06:19 )    Color: x / Appearance: x / SG: x / pH: x  Gluc: 124 mg/dL / Ketone: x  / Bili: x / Urobili: x   Blood: x / Protein: x / Nitrite: x   Leuk Esterase: x / RBC: x / WBC x   Sq Epi: x / Non Sq Epi: x / Bacteria: x        RADIOLOGY & ADDITIONAL STUDIES: STATUS POST:  p cystoscopy bilateral stent placement, robotic assisted APR, coccygectomy, end colostomy creation, L gracilis flap recon     POST OPERATIVE DAY #: 5    SUBJECTIVE: Patient seen and examined at bedside. Pain controlled. No nausea/vomiting. Minimal output per ostomy.       MEDICATIONS  (STANDING):  acetaminophen   IVPB .. 1000 milliGRAM(s) IV Intermittent every 6 hours  dextrose 5% + sodium chloride 0.45% with potassium chloride 20 mEq/L 1000 milliLiter(s) (100 mL/Hr) IV Continuous <Continuous>  heparin   Injectable 5000 Unit(s) SubCutaneous every 8 hours  pantoprazole  Injectable 40 milliGRAM(s) IV Push daily    MEDICATIONS  (PRN):  HYDROmorphone  Injectable 0.5 milliGRAM(s) IV Push every 6 hours PRN breakthrough pain on the floor  ondansetron Injectable 4 milliGRAM(s) IV Push every 6 hours PRN Nausea and/or Vomiting  oxyCODONE    IR 2.5 milliGRAM(s) Oral every 6 hours PRN Moderate Pain (4 - 6)  oxyCODONE    IR 5 milliGRAM(s) Oral every 6 hours PRN Severe Pain (7 - 10)      Vital Signs Last 24 Hrs  T(C): 36.9 (12 May 2025 04:58), Max: 36.9 (11 May 2025 17:19)  T(F): 98.4 (12 May 2025 04:58), Max: 98.5 (11 May 2025 17:19)  HR: 70 (12 May 2025 04:58) (65 - 70)  BP: 137/82 (12 May 2025 04:58) (137/82 - 158/84)  BP(mean): --  RR: 17 (12 May 2025 04:58) (17 - 18)  SpO2: 96% (12 May 2025 04:58) (96% - 98%)    Parameters below as of 12 May 2025 04:58  Patient On (Oxygen Delivery Method): room air        PHYSICAL EXAM  General: NAD, resting comfortably  Pulmonary: normal resp effort  Cardiovascular: NSR  Abdominal: Soft, mildly distended, nontender. Ostomy with minimal bowel sweat and no gas.  Extremities: WWP, no clubbing/cyanosis/edema  Neuro: A/O x 3, no focal deficits      I&O's Detail    11 May 2025 07:01  -  12 May 2025 07:00  --------------------------------------------------------  IN:    dextrose 5% + sodium chloride 0.45% w/ Additives: 1100 mL  Total IN: 1100 mL    OUT:    Bulb (mL): 215 mL    Colostomy (mL): 10 mL    Indwelling Catheter - Urethral (mL): 1800 mL    Nasogastric/Oral tube (mL): 1950 mL  Total OUT: 3975 mL    Total NET: -2875 mL          LABS:                        11.4   7.11  )-----------( 277      ( 12 May 2025 06:19 )             34.4     05-12    139  |  103  |  10  ----------------------------<  124[H]  3.6   |  26  |  0.67    Ca    8.2[L]      12 May 2025 06:19  Phos  2.4     05-12  Mg     1.9     05-12        Urinalysis Basic - ( 12 May 2025 06:19 )    Color: x / Appearance: x / SG: x / pH: x  Gluc: 124 mg/dL / Ketone: x  / Bili: x / Urobili: x   Blood: x / Protein: x / Nitrite: x   Leuk Esterase: x / RBC: x / WBC x   Sq Epi: x / Non Sq Epi: x / Bacteria: x        RADIOLOGY & ADDITIONAL STUDIES:

## 2025-05-12 NOTE — PROGRESS NOTE ADULT - ASSESSMENT
59 year old male with PMH of rectal cancer now s/p cystoscopy bilateral stent placement, robotic assisted APR, coccygectomy, end colostomy creation, L gracilis flap recon (5/6).    NPO/mIVF  NGT LIWS  PPI  Pain/nausea control PRN  DVT ppx  [place]  Plastics recs: shuffle gait, no sitting, no adduction  Ostomy consult  AM labs  Dispo: Outpatient PT 59 year old male with PMH of rectal cancer now s/p cystoscopy bilateral stent placement, robotic assisted APR, coccygectomy, end colostomy creation, L gracilis flap recon (5/6).    Add on 5/13 OR  NPO/mIVF  NGT LIWS  PPI  Pain/nausea control PRN  DVT ppx  [facility]  Plastics recs: shuffle gait, no sitting, no adduction  Ostomy consult  AM labs  Dispo: Outpatient PT

## 2025-05-12 NOTE — PROGRESS NOTE ADULT - ASSESSMENT
59 year old male with PMH of rectal cancer now s/p cystoscopy bilateral stent placement, robotic assisted APR, coccygectomy, end colostomy creation, L gracilis flap recon (5/6).    Plan  - abduction - no adduction  - shuffle walk  - PRN robaxim   - rest per primary     Nj Mead MD  Plastic and Reconstructive Surgery, PGY4  Available on [employer] Teams  [facility]: [id number], [place]: [phone] [facility]: Green Team [id number] [facility]: [id number]

## 2025-05-12 NOTE — PROGRESS NOTE ADULT - SUBJECTIVE AND OBJECTIVE BOX
Plastic Surgery Progress Note (pg [facility]: [id number], [place]: [phone])    SUBJECTIVE  The patient was seen and examined. CTAP yesterdya     OBJECTIVE  ___________________________________________________  VITAL SIGNS / I&O's   Vital Signs Last 24 Hrs  T(C): 36.9 (12 May 2025 04:58), Max: 36.9 (11 May 2025 17:19)  T(F): 98.4 (12 May 2025 04:58), Max: 98.5 (11 May 2025 17:19)  HR: 70 (12 May 2025 04:58) (65 - 70)  BP: 137/82 (12 May 2025 04:58) (137/82 - 158/84)  BP(mean): --  RR: 17 (12 May 2025 04:58) (17 - 18)  SpO2: 96% (12 May 2025 04:58) (96% - 98%)    Parameters below as of 12 May 2025 04:58  Patient On (Oxygen Delivery Method): room air          11 May 2025 07:01  -  12 May 2025 07:00  --------------------------------------------------------  IN:    dextrose 5% + sodium chloride 0.45% w/ Additives: 1200 mL  Total IN: 1200 mL    OUT:    Bulb (mL): 215 mL    Colostomy (mL): 10 mL    Indwelling Catheter - Urethral (mL): 1800 mL    Nasogastric/Oral tube (mL): 2050 mL  Total OUT: 4075 mL    Total NET: -2875 mL        ___________________________________________________  PHYSICAL EXAM    -- CONSTITUTIONAL: NAD, lying in bed  -- NEURO: Awake, alert  thigh soft L, no collections  sacral incision cdi   ___________________________________________________  LABS                        11.4   7.11  )-----------( 277      ( 12 May 2025 06:19 )             34.4     12 May 2025 06:19    139    |  103    |  10     ----------------------------<  124    3.6     |  26     |  0.67     Ca    8.2        12 May 2025 06:19  Phos  2.4       12 May 2025 06:19  Mg     1.9       12 May 2025 06:19        CAPILLARY BLOOD GLUCOSE            Urinalysis Basic - ( 12 May 2025 06:19 )    Color: x / Appearance: x / SG: x / pH: x  Gluc: 124 mg/dL / Ketone: x  / Bili: x / Urobili: x   Blood: x / Protein: x / Nitrite: x   Leuk Esterase: x / RBC: x / WBC x   Sq Epi: x / Non Sq Epi: x / Bacteria: x      ___________________________________________________  MICRO  Recent Cultures:  Specimen Source: Urine #1 Urine Culture, 05-06 @ 08:55; Results   No growth; Gram Stain: --; Organism: --    ___________________________________________________  MEDICATIONS  (STANDING):  acetaminophen   IVPB .. 1000 milliGRAM(s) IV Intermittent every 6 hours  dextrose 5% + sodium chloride 0.45% with potassium chloride 20 mEq/L 1000 milliLiter(s) (100 mL/Hr) IV Continuous <Continuous>  heparin   Injectable 5000 Unit(s) SubCutaneous every 8 hours  pantoprazole  Injectable 40 milliGRAM(s) IV Push daily    MEDICATIONS  (PRN):  HYDROmorphone  Injectable 0.5 milliGRAM(s) IV Push every 6 hours PRN breakthrough pain on the floor  ondansetron Injectable 4 milliGRAM(s) IV Push every 6 hours PRN Nausea and/or Vomiting  oxyCODONE    IR 2.5 milliGRAM(s) Oral every 6 hours PRN Moderate Pain (4 - 6)  oxyCODONE    IR 5 milliGRAM(s) Oral every 6 hours PRN Severe Pain (7 - 10)

## 2025-05-13 ENCOUNTER — TRANSCRIPTION ENCOUNTER (OUTPATIENT)
Age: 60
End: 2025-05-13

## 2025-05-13 LAB
ANION GAP SERPL CALC-SCNC: 6 MMOL/L — SIGNIFICANT CHANGE UP (ref 5–17)
APTT BLD: 38.2 SEC — HIGH (ref 26.1–36.8)
BLD GP AB SCN SERPL QL: NEGATIVE — SIGNIFICANT CHANGE UP
BUN SERPL-MCNC: 5 MG/DL — LOW (ref 7–23)
CALCIUM SERPL-MCNC: 8.1 MG/DL — LOW (ref 8.4–10.5)
CHLORIDE SERPL-SCNC: 104 MMOL/L — SIGNIFICANT CHANGE UP (ref 96–108)
CO2 SERPL-SCNC: 27 MMOL/L — SIGNIFICANT CHANGE UP (ref 22–31)
CREAT SERPL-MCNC: 0.62 MG/DL — SIGNIFICANT CHANGE UP (ref 0.5–1.3)
EGFR: 111 ML/MIN/1.73M2 — SIGNIFICANT CHANGE UP
EGFR: 111 ML/MIN/1.73M2 — SIGNIFICANT CHANGE UP
GLUCOSE SERPL-MCNC: 121 MG/DL — HIGH (ref 70–99)
HCT VFR BLD CALC: 34.3 % — LOW (ref 39–50)
HGB BLD-MCNC: 11.3 G/DL — LOW (ref 13–17)
INR BLD: 1.17 — HIGH (ref 0.85–1.16)
MAGNESIUM SERPL-MCNC: 1.9 MG/DL — SIGNIFICANT CHANGE UP (ref 1.6–2.6)
MCHC RBC-ENTMCNC: 29 PG — SIGNIFICANT CHANGE UP (ref 27–34)
MCHC RBC-ENTMCNC: 32.9 G/DL — SIGNIFICANT CHANGE UP (ref 32–36)
MCV RBC AUTO: 88.2 FL — SIGNIFICANT CHANGE UP (ref 80–100)
NRBC BLD AUTO-RTO: 0 /100 WBCS — SIGNIFICANT CHANGE UP (ref 0–0)
PHOSPHATE SERPL-MCNC: 2.7 MG/DL — SIGNIFICANT CHANGE UP (ref 2.5–4.5)
PLATELET # BLD AUTO: 284 K/UL — SIGNIFICANT CHANGE UP (ref 150–400)
POTASSIUM SERPL-MCNC: 3.8 MMOL/L — SIGNIFICANT CHANGE UP (ref 3.5–5.3)
POTASSIUM SERPL-SCNC: 3.8 MMOL/L — SIGNIFICANT CHANGE UP (ref 3.5–5.3)
PROTHROM AB SERPL-ACNC: 13.7 SEC — HIGH (ref 9.9–13.4)
RBC # BLD: 3.89 M/UL — LOW (ref 4.2–5.8)
RBC # FLD: 14.4 % — SIGNIFICANT CHANGE UP (ref 10.3–14.5)
RH IG SCN BLD-IMP: POSITIVE — SIGNIFICANT CHANGE UP
SODIUM SERPL-SCNC: 137 MMOL/L — SIGNIFICANT CHANGE UP (ref 135–145)
WBC # BLD: 7.25 K/UL — SIGNIFICANT CHANGE UP (ref 3.8–10.5)
WBC # FLD AUTO: 7.25 K/UL — SIGNIFICANT CHANGE UP (ref 3.8–10.5)

## 2025-05-13 PROCEDURE — 74019 RADEX ABDOMEN 2 VIEWS: CPT | Mod: 26

## 2025-05-13 RX ORDER — MAGNESIUM SULFATE 500 MG/ML
1 SYRINGE (ML) INJECTION ONCE
Refills: 0 | Status: COMPLETED | OUTPATIENT
Start: 2025-05-13 | End: 2025-05-13

## 2025-05-13 RX ADMIN — POTASSIUM CHLORIDE, DEXTROSE MONOHYDRATE AND SODIUM CHLORIDE 100 MILLILITER(S): 150; 5; 900 INJECTION, SOLUTION INTRAVENOUS at 07:51

## 2025-05-13 RX ADMIN — POTASSIUM CHLORIDE, DEXTROSE MONOHYDRATE AND SODIUM CHLORIDE 100 MILLILITER(S): 150; 5; 900 INJECTION, SOLUTION INTRAVENOUS at 17:45

## 2025-05-13 RX ADMIN — Medication 400 MILLIGRAM(S): at 01:23

## 2025-05-13 RX ADMIN — Medication 40 MILLIGRAM(S): at 12:36

## 2025-05-13 RX ADMIN — HEPARIN SODIUM 5000 UNIT(S): 1000 INJECTION INTRAVENOUS; SUBCUTANEOUS at 21:54

## 2025-05-13 RX ADMIN — HEPARIN SODIUM 5000 UNIT(S): 1000 INJECTION INTRAVENOUS; SUBCUTANEOUS at 14:14

## 2025-05-13 RX ADMIN — Medication 100 MILLIEQUIVALENT(S): at 09:32

## 2025-05-13 RX ADMIN — Medication 400 MILLIGRAM(S): at 20:32

## 2025-05-13 RX ADMIN — Medication 100 GRAM(S): at 07:42

## 2025-05-13 RX ADMIN — Medication 400 MILLIGRAM(S): at 06:13

## 2025-05-13 RX ADMIN — Medication 0.5 MILLIGRAM(S): at 04:56

## 2025-05-13 RX ADMIN — Medication 0.5 MILLIGRAM(S): at 05:11

## 2025-05-13 RX ADMIN — HEPARIN SODIUM 5000 UNIT(S): 1000 INJECTION INTRAVENOUS; SUBCUTANEOUS at 06:12

## 2025-05-13 NOTE — DISCHARGE NOTE PROVIDER - PROVIDER TOKENS
PROVIDER:[TOKEN:[[id number]],FOLLOWUP:[1 week]],PROVIDER:[TOKEN:[[id number]],FOLLOWUP:[2 weeks]]

## 2025-05-13 NOTE — ADVANCED PRACTICE NURSE CONSULT - RECOMMEDATIONS
Will continue to follow. Total time spent on encounter=40 minutes
Will continue to follow. Total time spent on encounter=60 minutes
Will continue to follow. Total time spent on encounter=45 minutes
Will continue to follow. Total time spent on encounter=55 minutes.

## 2025-05-13 NOTE — ADVANCED PRACTICE NURSE CONSULT - ASSESSMENT
Review of previous education performed and patient asked pertinent questions. Appliance had leaked yesterday so new one had been applied. Green liquid output in pouch, pt demonstrated emptying gas as well as stool. Extra supplies at bedside for discharge. 
59 year old male s/p cystoscopy, bilateral stent placement, robotic assisted APR, coccygectomy, end colostomy creation, L gracilis flap recon (5/6). Pt had been given education prior to surgery so this was reviewed: frequency of emptying and changing appliance, bathing, ordering supplies after discharge, emptying gas from top of pouch, use of closed-end pouch if he chooses once stool becomes more solid.  Stoma pink and functioning, measures approx 1 1/2", oval, effluent medium brown liquid. Extra supplies at bedside for discharge. 
Pt's family at bedside for education on ostomy care. Pt's sister will be the person staying with him once he is discharged. Using a stoma model, reviewed frequency of emptying and changing appliance. New 2 1/4" Mondamin 2 piece appliance then placed with patient performing much of the procedure with only verbal prompting. AMANDA drain had been removed and site continues to produce serous drainage.  ostomy pouch applied over area due to large amount of drainage from site. 
Pt assisted with application of new 2 1/4" [place] 2 piece appliance. 3 small blisters noted to abdomen which was beneath old appliance, blisters are intact. Stoma powder and Cavilon applied to sites, left open to air. Stoma measures approx 1 1/2", pt assisted with measuring using old pattern than he cut skin barrier independently. Stoma rings placed around stoma prior to placing skin barrier. Effluent is serous in old pouch. Extra supplies at bedside.

## 2025-05-13 NOTE — PROGRESS NOTE ADULT - SUBJECTIVE AND OBJECTIVE BOX
INTERVAL HPI/OVERNIGHT EVENTS: JESSICA    STATUS POST:  cystoscopy bilateral stent placement, robotic assisted APR, coccygectomy, end colostomy creation, gracilis flap recon    POST OPERATIVE DAY #: 7    SUBJECTIVE:  pt seen at bedside, denies pain, nausea/vomiting    MEDICATIONS  (STANDING):  acetaminophen   IVPB .. 1000 milliGRAM(s) IV Intermittent every 6 hours  dextrose 5% + sodium chloride 0.45% with potassium chloride 20 mEq/L 1000 milliLiter(s) (100 mL/Hr) IV Continuous <Continuous>  heparin   Injectable 5000 Unit(s) SubCutaneous every 8 hours  pantoprazole  Injectable 40 milliGRAM(s) IV Push daily    MEDICATIONS  (PRN):  HYDROmorphone  Injectable 0.5 milliGRAM(s) IV Push every 6 hours PRN breakthrough pain on the floor  ondansetron Injectable 4 milliGRAM(s) IV Push every 6 hours PRN Nausea and/or Vomiting  oxyCODONE    IR 2.5 milliGRAM(s) Oral every 6 hours PRN Moderate Pain (4 - 6)  oxyCODONE    IR 5 milliGRAM(s) Oral every 6 hours PRN Severe Pain (7 - 10)      Vital Signs Last 24 Hrs  T(C): 36.7 (13 May 2025 05:05), Max: 36.9 (12 May 2025 20:15)  T(F): 98 (13 May 2025 05:05), Max: 98.4 (12 May 2025 20:15)  HR: 58 (13 May 2025 05:05) (58 - 97)  BP: 166/82 (13 May 2025 05:05) (138/83 - 166/82)  BP(mean): 107 (12 May 2025 20:15) (107 - 107)  RR: 17 (13 May 2025 05:05) (17 - 19)  SpO2: 97% (13 May 2025 05:05) (97% - 97%)    Parameters below as of 13 May 2025 05:05  Patient On (Oxygen Delivery Method): room air        PHYSICAL EXAM:      Constitutional: A&Ox3    Respiratory: non labored breathing, no respiratory distress    Cardiovascular: NSR, RRR    Gastrointestinal: soft, nontender, nondistended, incisions c/d/i. ostomy pink and patent with minimal liquid stool    Extremities: (-) edema                  I&O's Detail    11 May 2025 07:01  -  12 May 2025 07:00  --------------------------------------------------------  IN:    dextrose 5% + sodium chloride 0.45% w/ Additives: 1200 mL  Total IN: 1200 mL    OUT:    Bulb (mL): 215 mL    Colostomy (mL): 10 mL    Indwelling Catheter - Urethral (mL): 1800 mL    Nasogastric/Oral tube (mL): 2050 mL  Total OUT: 4075 mL    Total NET: -2875 mL      12 May 2025 07:01  -  13 May 2025 06:48  --------------------------------------------------------  IN:    dextrose 5% + sodium chloride 0.45% w/ Additives: 900 mL  Total IN: 900 mL    OUT:    Bulb (mL): 120 mL    Colostomy (mL): 190 mL    Indwelling Catheter - Urethral (mL): 2440 mL    Nasogastric/Oral tube (mL): 850 mL    Voided (mL): 200 mL  Total OUT: 3800 mL    Total NET: -2900 mL          LABS:                        11.3   7.25  )-----------( 284      ( 13 May 2025 06:00 )             34.3     05-13    137  |  104  |  5[L]  ----------------------------<  121[H]  3.8   |  27  |  0.62    Ca    8.1[L]      13 May 2025 06:00  Phos  2.7     05-13  Mg     1.9     05-13      PT/INR - ( 13 May 2025 06:00 )   PT: 13.7 sec;   INR: 1.17          PTT - ( 13 May 2025 06:00 )  PTT:38.2 sec  Urinalysis Basic - ( 13 May 2025 06:00 )    Color: x / Appearance: x / SG: x / pH: x  Gluc: 121 mg/dL / Ketone: x  / Bili: x / Urobili: x   Blood: x / Protein: x / Nitrite: x   Leuk Esterase: x / RBC: x / WBC x   Sq Epi: x / Non Sq Epi: x / Bacteria: x        RADIOLOGY & ADDITIONAL STUDIES:

## 2025-05-13 NOTE — DISCHARGE NOTE PROVIDER - NSDCFUADDINST_GEN_ALL_CORE_FT
General Discharge Instructions:  Please resume all regular home medications unless specifically advised not to take a particular medication. Also, please take any new medications as prescribed.  Please get plenty of rest, continue to ambulate several times per day, and drink adequate amounts of fluids. Avoid lifting weights greater than 5-10 lbs until you follow-up with your surgeon, who will instruct you further regarding activity restrictions.  Avoid driving or operating heavy machinery while taking pain medications.  Please follow-up with your surgeon and Primary Care Provider (PCP) as advised.  Incision Care:  *Please call your doctor or nurse practitioner if you have increased pain, swelling, redness, or drainage from the incision site.  *Avoid swimming and baths until your follow-up appointment.  *You may shower, and wash surgical incisions with a mild soap and warm water. Gently pat the area dry.  *If you have staples, they will be removed at your follow-up appointment.  *If you have steri-strips, they will fall off on their own. Please remove any remaining strips 7-10 days after surgery.     Warning Signs:  Please call your doctor or nurse practitioner if you experience the following:  *You experience new chest pain, pressure, squeezing or tightness.  *New or worsening cough, shortness of breath, or wheeze.  *If you are vomiting and cannot keep down fluids or your medications.  *You are getting dehydrated due to continued vomiting, diarrhea, or other reasons. Signs of dehydration include dry mouth, rapid heartbeat, or feeling dizzy or faint when standing.  *You see blood or dark/black material when you vomit or have a bowel movement.  *You experience burning when you urinate, have blood in your urine, or experience a discharge.  *Your pain is not improving within 8-12 hours or is not gone within 24 hours. Call or return immediately if your pain is getting worse, changes location, or moves to your chest or back.  *You have shaking chills, or fever greater than 101.5 degrees Fahrenheit or 38 degrees Celsius.  *Any change in your symptoms, or any new symptoms that concern you.  Post-operative care instructions  *You may have some intermittent pain for up to six (6) weeks post operatively. Pain does not signify any problem unless associated with fever, chills, or inability to void.  If you experience any fevers or chills please call immediately as this may be signs of an infection. You may take Tylenol (acetaminophen) 650-975mg and/or Motrin (ibuprofen) 400-600mg, both available over the counter, for pain every 6 hours as needed. Do not exceed 4000mg of Tylenol (acetaminophen) daily. You may alternate these medications such that you take one or the other every 3 hours for around the clock pain coverage.    *Do not allow yourself to become constipated as straining may cause bleeding. Take stool softeners or a laxative (ex. Miralax, Colace, Senokot, ExLax, etc), available over the counter, if taking Oxycodone.    General Discharge Instructions:  Please resume all regular home medications unless specifically advised not to take a particular medication. Also, please take any new medications as prescribed.  Please get plenty of rest, continue to ambulate several times per day, and drink adequate amounts of fluids. Avoid lifting weights greater than 5-10 lbs until you follow-up with your surgeon, who will instruct you further regarding activity restrictions.  Avoid driving or operating heavy machinery while taking pain medications.  Please follow-up with your surgeon and Primary Care Provider (PCP) as advised.  Incision Care:  *Please call your doctor or nurse practitioner if you have increased pain, swelling, redness, or drainage from the incision site.  *Avoid swimming and baths until your follow-up appointment.  *You may shower, and wash surgical incisions with a mild soap and warm water. Gently pat the area dry.  *If you have staples, they will be removed at your follow-up appointment.  *If you have steri-strips, they will fall off on their own. Please remove any remaining strips 7-10 days after surgery.     Warning Signs:  Please call your doctor or nurse practitioner if you experience the following:  *You experience new chest pain, pressure, squeezing or tightness.  *New or worsening cough, shortness of breath, or wheeze.  *If you are vomiting and cannot keep down fluids or your medications.  *You are getting dehydrated due to continued vomiting, diarrhea, or other reasons. Signs of dehydration include dry mouth, rapid heartbeat, or feeling dizzy or faint when standing.  *You see blood or dark/black material when you vomit or have a bowel movement.  *You experience burning when you urinate, have blood in your urine, or experience a discharge.  *Your pain is not improving within 8-12 hours or is not gone within 24 hours. Call or return immediately if your pain is getting worse, changes location, or moves to your chest or back.  *You have shaking chills, or fever greater than 101.5 degrees Fahrenheit or 38 degrees Celsius.  *Any change in your symptoms, or any new symptoms that concern you.

## 2025-05-13 NOTE — DISCHARGE NOTE PROVIDER - NSDCCPTREATMENT_GEN_ALL_CORE_FT
PRINCIPAL PROCEDURE  Procedure: Robot-assisted laparoscopic abdominoperineal resection (APR)  Findings and Treatment:       SECONDARY PROCEDURE  Procedure: Coccygectomy  Findings and Treatment:     Procedure: Gracilis muscle flap procedure  Findings and Treatment:     Procedure: Diagnostic laparoscopy  Findings and Treatment:     Procedure: Cystoscopy, with bilateral ureteral catheterization  Findings and Treatment: ICG b/l, freeman, and removal of bladder stones, small

## 2025-05-13 NOTE — PROGRESS NOTE ADULT - SUBJECTIVE AND OBJECTIVE BOX
STATUS POST:  p cystoscopy bilateral stent placement, robotic assisted APR, coccygectomy, end colostomy creation, L gracilis flap recon     POST OPERATIVE DAY #: 7    SUBJECTIVE:      MEDICATIONS  (STANDING):  acetaminophen   IVPB .. 1000 milliGRAM(s) IV Intermittent every 6 hours  dextrose 5% + sodium chloride 0.45% with potassium chloride 20 mEq/L 1000 milliLiter(s) (100 mL/Hr) IV Continuous <Continuous>  heparin   Injectable 5000 Unit(s) SubCutaneous every 8 hours  pantoprazole  Injectable 40 milliGRAM(s) IV Push daily    MEDICATIONS  (PRN):  HYDROmorphone  Injectable 0.5 milliGRAM(s) IV Push every 6 hours PRN breakthrough pain on the floor  ondansetron Injectable 4 milliGRAM(s) IV Push every 6 hours PRN Nausea and/or Vomiting  oxyCODONE    IR 2.5 milliGRAM(s) Oral every 6 hours PRN Moderate Pain (4 - 6)  oxyCODONE    IR 5 milliGRAM(s) Oral every 6 hours PRN Severe Pain (7 - 10)      Vital Signs Last 24 Hrs  T(C): 36.7 (13 May 2025 05:05), Max: 36.9 (12 May 2025 20:15)  T(F): 98 (13 May 2025 05:05), Max: 98.4 (12 May 2025 20:15)  HR: 58 (13 May 2025 05:05) (58 - 97)  BP: 166/82 (13 May 2025 05:05) (138/83 - 166/82)  BP(mean): 107 (12 May 2025 20:15) (107 - 107)  RR: 17 (13 May 2025 05:05) (17 - 19)  SpO2: 97% (13 May 2025 05:05) (97% - 97%)    Parameters below as of 13 May 2025 05:05  Patient On (Oxygen Delivery Method): room air        PHYSICAL EXAM  General: NAD, resting comfortably  Pulmonary: normal resp effort  Cardiovascular: NSR  Abdominal: Soft, mildly distended, nontender. Ostomy with minimal bowel sweat and no gas.  Extremities: WWP, no clubbing/cyanosis/edema  Neuro: A/O x 3, no focal deficits      I&O's Detail    11 May 2025 07:01  -  12 May 2025 07:00  --------------------------------------------------------  IN:    dextrose 5% + sodium chloride 0.45% w/ Additives: 1200 mL  Total IN: 1200 mL    OUT:    Bulb (mL): 215 mL    Colostomy (mL): 10 mL    Indwelling Catheter - Urethral (mL): 1800 mL    Nasogastric/Oral tube (mL): 2050 mL  Total OUT: 4075 mL    Total NET: -2875 mL      12 May 2025 07:01  -  13 May 2025 06:06  --------------------------------------------------------  IN:    dextrose 5% + sodium chloride 0.45% w/ Additives: 900 mL  Total IN: 900 mL    OUT:    Bulb (mL): 120 mL    Colostomy (mL): 190 mL    Indwelling Catheter - Urethral (mL): 2440 mL    Nasogastric/Oral tube (mL): 850 mL    Voided (mL): 200 mL  Total OUT: 3800 mL    Total NET: -2900 mL          LABS:                        11.4   7.11  )-----------( 277      ( 12 May 2025 06:19 )             34.4     05-12    139  |  103  |  10  ----------------------------<  124[H]  3.6   |  26  |  0.67    Ca    8.2[L]      12 May 2025 06:19  Phos  2.4     05-12  Mg     1.9     05-12        Urinalysis Basic - ( 12 May 2025 06:19 )    Color: x / Appearance: x / SG: x / pH: x  Gluc: 124 mg/dL / Ketone: x  / Bili: x / Urobili: x   Blood: x / Protein: x / Nitrite: x   Leuk Esterase: x / RBC: x / WBC x   Sq Epi: x / Non Sq Epi: x / Bacteria: x        RADIOLOGY & ADDITIONAL STUDIES:

## 2025-05-13 NOTE — DISCHARGE NOTE PROVIDER - NSDCMRMEDTOKEN_GEN_ALL_CORE_FT
Colace 100 mg oral capsule: 1 cap(s) orally every 12 hours Please take if using oxycodone for pain to ensure regular bowel movements and prevent constipation  oxyCODONE 5 mg oral tablet: 1 tab(s) orally every 6 hours as needed for  severe pain MDD: 4

## 2025-05-13 NOTE — DISCHARGE NOTE PROVIDER - CARE PROVIDER_API CALL
Omar Barth  Surgery  [address] [facility]  [place] [address]  Phone: [phone]  Fax: [phone]  Follow Up Time: 1 week    Westley Sánchez  Plastic Surgery  [address] [place] [id number]  Phone: [phone]  Fax: [phone]  Follow Up Time: 2 weeks

## 2025-05-13 NOTE — DISCHARGE NOTE PROVIDER - NSDCACTIVITY_GEN_ALL_CORE
Showering allowed/Stairs allowed/Walking - Indoors allowed/No heavy lifting/straining/Walking - Outdoors allowed/Follow Instructions Provided by your Surgical Team
Stable

## 2025-05-13 NOTE — DISCHARGE NOTE PROVIDER - HOSPITAL COURSE
59 year old male with PMH of rectal cancer now s/p cystoscopy bilateral stent placement, robotic assisted APR, coccygectomy, end colostomy creation, L gracilis flap recon (5/6). Transferred to PACU in stable condition. No perioperative complications noted. Post-op course c/b SBO. On 5/11, : 1L emesis, NGT placed, CXR confirmed positioning, ~900cc output. GG administered, and clamped, CTAP revealed SBO w/ TP in mid lower abdomen. AMANDA drain removed on 5/12. On 5/13, patient underwent _____      Diet advanced as tolerated and per return of bowel function. Singleton removed on ____ At time of discharge pt is tolerating diet, pain well controlled, pt is ambulating/voiding freely, having adequate bowel function. Pt is hemodynamically normal/stable and medically ready for discharge with outpatient follow-up.         Last updated 5/13** 59 year old male with PMH of rectal cancer now s/p cystoscopy bilateral stent placement, robotic assisted APR, coccygectomy, end colostomy creation, L gracilis flap recon (5/6). Transferred to PACU in stable condition. No perioperative complications noted. Post-op course c/b SBO. On 5/11, 1L emesis, NGT placed, CXR confirmed positioning, ~900cc output. GG administered, and clamped, CTAP revealed SBO w/ TP in mid lower abdomen. AMANDA drain removed on 5/12. On 5/15, patient underwent diagnostic laparoscopy and lysis of adhesions. On 5/16, freeman was removed and patient passed a trial of void. Patient with NGT decompression until ostomy began to have function, and on 5/17 a GG challenge was done, with Xray showing contrast progressing into the colon, and NGT was removed. Patient's diet was advanced as tolerated, and patient with continued ostomy function.    At time of discharge pt is tolerating diet, pain well controlled, pt is ambulating/voiding freely, having adequate bowel function. Pt is hemodynamically normal/stable and medically ready for discharge with outpatient follow-up.         Last updated 5/19*   59 year old male with PMH of rectal cancer now s/p cystoscopy bilateral stent placement, robotic assisted APR, coccygectomy, end colostomy creation, L gracilis flap recon (5/6). Transferred to PACU in stable condition. No perioperative complications noted. Post-op course c/b SBO. On 5/11, 1L emesis, NGT placed, CXR confirmed positioning, ~900cc output. GG administered, and clamped, CTAP revealed SBO w/ TP in mid lower abdomen. AMANDA drain removed on 5/12. On 5/15, patient underwent diagnostic laparoscopy and lysis of adhesions. On 5/16, freeman was removed and patient passed a trial of void. Patient with NGT decompression until ostomy began to have function, and on 5/17 a GG challenge was done, with Xray showing contrast progressing into the colon, and NGT was removed. Patient's diet was advanced as tolerated, and patient with continued ostomy function. At time of discharge pt is tolerating diet, pain well controlled, pt is ambulating/voiding freely, having adequate bowel function. Pt is hemodynamically normal/stable and medically ready for discharge with outpatient follow-up.

## 2025-05-13 NOTE — DISCHARGE NOTE PROVIDER - NSDCFUADDAPPT_GEN_ALL_CORE_FT
Follow up in [facility]: [phone]    Please schedule a follow-up appointment with Dr. Barth within 2 weeks of discharge from the hospital. You may reach his office at [phone] at your earliest convenience.

## 2025-05-13 NOTE — PROGRESS NOTE ADULT - ASSESSMENT
59 year old male with PMH of rectal cancer now s/p cystoscopy bilateral stent placement, robotic assisted APR, coccygectomy, end colostomy creation, L gracilis flap recon (5/6).    To OR dx lap, CHRIS 5/13  NPO/mIVF  NGT LIWS  PPI  Pain/nausea control PRN  DVT ppx  Singleton  Plastics recs: shuffle gait, no sitting, no adduction  Ostomy consult  AM labs  Dispo: Outpatient PT

## 2025-05-13 NOTE — CHART NOTE - NSCHARTNOTEFT_GEN_A_CORE
Admitting Diagnosis:   Patient is a 59y old  Male who presents with a chief complaint of rectal cancer (13 May 2025 10:46)      PAST MEDICAL & SURGICAL HISTORY:  Malignant neoplasm of rectum  chemo, radiation 12/2024  Port-A-Cath in place  left 2024      Current Nutrition Order:  Diet, NPO after Midnight:      NPO Start Date: 12-May-2025,   NPO Start Time: 23:59 (05-12-25 @ 07:16) [Active]  Diet, NPO:   Except Medications (05-10-25 @ 01:57) [Active]    PO Intake: Good (%) [   ]  Fair (50-75%) [   ] Poor (<25%) [   ] ... remains NPO due to post-op SBO     GI Issues: reports abdominal pain this AM but resolved status post pain medication, otherwise denies nausea/ vomiting/ diarrhea with NGT in place; colostomy now with some output this AM per flowsheet/ surgery     Pain: 6/10 per flowsheet    Skin Integrity: surgical incision of abdomen; no edema documented, no pressure injuries documented, pardeep Bailey         05-12-25 @ 07:01  -  05-13-25 @ 07:00  --------------------------------------------------------  IN: 1200 mL / OUT: 4040 mL / NET: -2840 mL    05-13-25 @ 07:01  -  05-13-25 @ 12:44  --------------------------------------------------------  IN: 700 mL / OUT: 1150 mL / NET: -450 mL        Labs:   05-13    137  |  104  |  5[L]  ----------------------------<  121[H]  3.8   |  27  |  0.62    Ca    8.1[L]      13 May 2025 06:00  Phos  2.7     05-13  Mg     1.9     05-13        Medications:  MEDICATIONS  (STANDING):  acetaminophen   IVPB .. 1000 milliGRAM(s) IV Intermittent every 6 hours  dextrose 5% + sodium chloride 0.45% with potassium chloride 20 mEq/L 1000 milliLiter(s) (100 mL/Hr) IV Continuous <Continuous>  heparin   Injectable 5000 Unit(s) SubCutaneous every 8 hours  pantoprazole  Injectable 40 milliGRAM(s) IV Push daily    MEDICATIONS  (PRN):  HYDROmorphone  Injectable 0.5 milliGRAM(s) IV Push every 6 hours PRN breakthrough pain on the floor  ondansetron Injectable 4 milliGRAM(s) IV Push every 6 hours PRN Nausea and/or Vomiting  oxyCODONE    IR 2.5 milliGRAM(s) Oral every 6 hours PRN Moderate Pain (4 - 6)  oxyCODONE    IR 5 milliGRAM(s) Oral every 6 hours PRN Severe Pain (7 - 10)      Height for BMI (FEET)	5 Feet  Height for BMI (INCHES)	6 Inch(s)  Height for BMI (CENTIMETERS)	167.64 Centimeter(s)  Weight for BMI (lbs)	121 lb  Weight for BMI (kg)	54.9 kg  Body Mass Index	19.5  Ideal body weight 142 pounds / 64.5 kg (85%)     Weight Change: No new wt since admit.     Estimated energy needs:   Estimated Energy Needs Weight (lbs)	121 lb  Estimated Energy Needs Weight (kg)	54.8 kg  Estimated Energy Needs From (gino/kg)	30  Estimated Energy Needs To (gino/kg)	35  Estimated Energy Needs Calculated From (gino/kg)	1644  Estimated Energy Needs Calculated To (gino/kg)	1918    Estimated Protein Needs Weight (lbs)	121 lb  Estimated Protein Needs Weight (kg)	54.8 kg  Estimated Protein Needs From (g/kg)	1.4  Estimated Protein Needs To (g/kg)	1.6  Estimated Protein Needs Calculated From (g/kg)	76.72  Estimated Protein Needs Calculated To (g/kg)	87.68    Estimated Fluid Needs Weight (lbs)	121 lb  Estimated Fluid Needs Weight (kg)	54.8 kg  Estimated Fluid Needs From (ml/kg)	30  Estimated Fluid Needs To (ml/kg)	35  Estimated Fluid Needs Calculated From (ml/kg)	1644  Estimated Fluid Needs Calculated To (ml/kg)	1918    Other Calculations	Based on dosing wt 121 pounds as pt within % IBW (85%). Needs adjusted for post-op healing, hx cancer status post chemo, age.    Subjective: "59 year old male with PMH of rectal cancer now s/p cystoscopy bilateral stent placement, robotic assisted APR, coccygectomy, end colostomy creation, L gracilis flap recon (5/6)."     Patient seen at bedside on 9ur for nutrition follow up. Current diet order: remains NPO with NGT to LWS this AM due to post-op SBO. Was planned for OR today for CHRIS but cancelled this AM as ostomy producing some output per surgery team. Continues to meet <50% estimated nutrient needs x >/= 1 week at this time, if unable to advance diet to would strongly recommend nutrition support to meet estimated nutrient needs due to prolonged NPO status - please see below. Denies nausea/vomiting/diarrhea but complains of abdominal pain this (resolved after pain meds per pt); colostomy now with green output as of this AM per flowsheet. Labs: BUN low, glucose 121 mg/dl. Meds: remains on D5 + IVF; zofran and oxycodone PRN. RD continues to follow, see nutrition recommendations below.     Previous Nutrition Diagnosis: Increased Nutrient Needs... calories, protein related to physiological demands as evidenced by post-op healing with new colostomy, hx cancer status post chemo.     Active [   ]  Resolved [   ] ... adjusted (X)     New PES: Inadequate oral intake related to SBO as evidenced by prolonged NPO status.     Goal: Pt will consume >/= 75% of estimated nutrient needs via tolerated route.     Recommendations:  1. If unable to advance diet / pt unable to tolerate PO intake within 24 hrs would strongly recommend to initiate nutrition support, see below  -If TPN/PICC placed: goal of 280g dextrose, 85g amino acids, and 50g 20% SMOF lipids to provide in total 1792 kcal (33 kcal/kg dosing wt 54.8kg) and 85g protein (1.6 g/kg dosing wt 54.8kg). GIR = 3.5 (WNL)   >>Start at 100g dex on day 1, 200g dex on day 2, and advance to goal of 280g dex on day 3.  >>Likely at high risk for REFEEDING syndrome. Advance nutrition cautiously. If electrolytes low, consider holding initiation or increase in dextrose until electrolytes are supplemented and/or normalized. Please provide MVI and thiamine in TPN bag.   2. Continue to advance diet as tolerated to low-fiber diet as medically feasible per MD orders   >> Once diet advanced, consider oral nutrition supplement should intake be </= 50%   3. Monitor wt trends, GI function, skin integrity  4. Monitor lytes, renal indices, blood glucose, LFTs    5. Pain and bowel regimen per team   6. Align nutrition with goals of care at all times     Education: discussed typical diet progression pending continued bowel function / per surgery advancement; introduced concept of TPN if NPO status continues - pt receptive and appreciative.     RD to remain available for additional nutrition interventions as needed.

## 2025-05-14 LAB
ANION GAP SERPL CALC-SCNC: 7 MMOL/L — SIGNIFICANT CHANGE UP (ref 5–17)
BUN SERPL-MCNC: 3 MG/DL — LOW (ref 7–23)
CALCIUM SERPL-MCNC: 8.5 MG/DL — SIGNIFICANT CHANGE UP (ref 8.4–10.5)
CHLORIDE SERPL-SCNC: 105 MMOL/L — SIGNIFICANT CHANGE UP (ref 96–108)
CO2 SERPL-SCNC: 27 MMOL/L — SIGNIFICANT CHANGE UP (ref 22–31)
CREAT SERPL-MCNC: 0.64 MG/DL — SIGNIFICANT CHANGE UP (ref 0.5–1.3)
EGFR: 110 ML/MIN/1.73M2 — SIGNIFICANT CHANGE UP
EGFR: 110 ML/MIN/1.73M2 — SIGNIFICANT CHANGE UP
GLUCOSE SERPL-MCNC: 120 MG/DL — HIGH (ref 70–99)
HCT VFR BLD CALC: 36.5 % — LOW (ref 39–50)
HGB BLD-MCNC: 12 G/DL — LOW (ref 13–17)
MAGNESIUM SERPL-MCNC: 2 MG/DL — SIGNIFICANT CHANGE UP (ref 1.6–2.6)
MCHC RBC-ENTMCNC: 29.1 PG — SIGNIFICANT CHANGE UP (ref 27–34)
MCHC RBC-ENTMCNC: 32.9 G/DL — SIGNIFICANT CHANGE UP (ref 32–36)
MCV RBC AUTO: 88.4 FL — SIGNIFICANT CHANGE UP (ref 80–100)
NRBC BLD AUTO-RTO: 0 /100 WBCS — SIGNIFICANT CHANGE UP (ref 0–0)
PHOSPHATE SERPL-MCNC: 2.9 MG/DL — SIGNIFICANT CHANGE UP (ref 2.5–4.5)
PLATELET # BLD AUTO: 311 K/UL — SIGNIFICANT CHANGE UP (ref 150–400)
POTASSIUM SERPL-MCNC: 4 MMOL/L — SIGNIFICANT CHANGE UP (ref 3.5–5.3)
POTASSIUM SERPL-SCNC: 4 MMOL/L — SIGNIFICANT CHANGE UP (ref 3.5–5.3)
RBC # BLD: 4.13 M/UL — LOW (ref 4.2–5.8)
RBC # FLD: 14.7 % — HIGH (ref 10.3–14.5)
SODIUM SERPL-SCNC: 139 MMOL/L — SIGNIFICANT CHANGE UP (ref 135–145)
WBC # BLD: 7.79 K/UL — SIGNIFICANT CHANGE UP (ref 3.8–10.5)
WBC # FLD AUTO: 7.79 K/UL — SIGNIFICANT CHANGE UP (ref 3.8–10.5)

## 2025-05-14 RX ORDER — LIDOCAINE HCL/PF 10 MG/ML
20 VIAL (ML) INJECTION ONCE
Refills: 0 | Status: COMPLETED | OUTPATIENT
Start: 2025-05-14 | End: 2025-05-14

## 2025-05-14 RX ORDER — ACETAMINOPHEN 500 MG/5ML
1000 LIQUID (ML) ORAL EVERY 6 HOURS
Refills: 0 | Status: DISCONTINUED | OUTPATIENT
Start: 2025-05-14 | End: 2025-05-15

## 2025-05-14 RX ADMIN — POTASSIUM CHLORIDE, DEXTROSE MONOHYDRATE AND SODIUM CHLORIDE 100 MILLILITER(S): 150; 5; 900 INJECTION, SOLUTION INTRAVENOUS at 11:43

## 2025-05-14 RX ADMIN — Medication 40 MILLIGRAM(S): at 11:43

## 2025-05-14 RX ADMIN — HEPARIN SODIUM 5000 UNIT(S): 1000 INJECTION INTRAVENOUS; SUBCUTANEOUS at 13:20

## 2025-05-14 RX ADMIN — Medication 40 MILLIGRAM(S): at 20:57

## 2025-05-14 RX ADMIN — HEPARIN SODIUM 5000 UNIT(S): 1000 INJECTION INTRAVENOUS; SUBCUTANEOUS at 21:00

## 2025-05-14 RX ADMIN — Medication 20 MILLILITER(S): at 16:30

## 2025-05-14 RX ADMIN — POTASSIUM CHLORIDE, DEXTROSE MONOHYDRATE AND SODIUM CHLORIDE 100 MILLILITER(S): 150; 5; 900 INJECTION, SOLUTION INTRAVENOUS at 03:20

## 2025-05-14 RX ADMIN — POTASSIUM CHLORIDE, DEXTROSE MONOHYDRATE AND SODIUM CHLORIDE 100 MILLILITER(S): 150; 5; 900 INJECTION, SOLUTION INTRAVENOUS at 22:29

## 2025-05-14 RX ADMIN — HEPARIN SODIUM 5000 UNIT(S): 1000 INJECTION INTRAVENOUS; SUBCUTANEOUS at 06:45

## 2025-05-14 NOTE — PROGRESS NOTE ADULT - SUBJECTIVE AND OBJECTIVE BOX
Plastic Surgery Progress Note (pg [facility]: [id number], [facility]: [phone])    SUBJECTIVE  The patient was seen and examined. did not go to OR yesterday as ostomy output increased.     OBJECTIVE  ___________________________________________________    T(C): 36.7 (05-14-25 @ 05:00), Max: 36.7 (05-14-25 @ 05:00)  T(F): 98 (05-14-25 @ 05:00), Max: 98 (05-14-25 @ 05:00)  HR: 63 (05-14-25 @ 05:00) (63 - 76)  BP: 146/84 (05-14-25 @ 05:00) (111/78 - 153/84)  RR: 17 (05-14-25 @ 05:00) (17 - 17)  SpO2: 97% (05-14-25 @ 05:00) (97% - 99%)      13 May 2025 07:01  -  14 May 2025 07:00  --------------------------------------------------------  IN:    dextrose 5% + sodium chloride 0.45% w/ Additives: 2400 mL    IV PiggyBack: 100 mL  Total IN: 2500 mL    OUT:    Colostomy (mL): 70 mL    Indwelling Catheter - Urethral (mL): 3775 mL    Nasogastric/Oral tube (mL): 600 mL  Total OUT: 4445 mL    Total NET: -1945 mL      14 May 2025 07:01  -  14 May 2025 08:48  --------------------------------------------------------  IN:    dextrose 5% + sodium chloride 0.45% w/ Additives: 100 mL  Total IN: 100 mL    OUT:  Total OUT: 0 mL    Total NET: 100 mL                ___________________________________________________  PHYSICAL EXAM    -- CONSTITUTIONAL: NAD, lying in bed  -- NEURO: Awake, alert  thigh soft L, no collections  sacral incision cdi   ___________________________________________________  LABS                  12.0   7.79   )----------(  311       ( 14 May 2025 06:10 )               36.5      139    |  105    |  3      ----------------------------<  120        ( 14 May 2025 06:10 )  4.0     |  27     |  0.64     Ca    8.5        ( 14 May 2025 06:10 )  Phos  2.9       ( 14 May 2025 06:10 )  Mg     2.0       ( 14 May 2025 06:10 )          CAPILLARY BLOOD GLUCOSE

## 2025-05-14 NOTE — PROGRESS NOTE ADULT - ASSESSMENT
59 year old male with PMH of rectal cancer now s/p cystoscopy bilateral stent placement, robotic assisted APR, coccygectomy, end colostomy creation, L gracilis flap recon (5/6).    NPO/mIVF  NGT LIWS  PPI  Pain/nausea control PRN  DVT ppx  Singleton  Plastics recs: shuffle gait, no sitting, no adduction  AM labs  Dispo: Outpatient PT

## 2025-05-14 NOTE — PROGRESS NOTE ADULT - SUBJECTIVE AND OBJECTIVE BOX
INTERVAL HPI/OVERNIGHT EVENTS: ostomy with scant liquid output and without gas in bag, -n/-v    STATUS POST:  cystoscopy bilateral stent placement, robotic assisted APR, coccygectomy, end colostomy creation, gracilis flap recon    POST OPERATIVE DAY #: 7    SUBJECTIVE:      MEDICATIONS  (STANDING):  dextrose 5% + sodium chloride 0.45% with potassium chloride 20 mEq/L 1000 milliLiter(s) (100 mL/Hr) IV Continuous <Continuous>  heparin   Injectable 5000 Unit(s) SubCutaneous every 8 hours  pantoprazole  Injectable 40 milliGRAM(s) IV Push daily    MEDICATIONS  (PRN):  HYDROmorphone  Injectable 0.5 milliGRAM(s) IV Push every 6 hours PRN breakthrough pain on the floor  ondansetron Injectable 4 milliGRAM(s) IV Push every 6 hours PRN Nausea and/or Vomiting  oxyCODONE    IR 2.5 milliGRAM(s) Oral every 6 hours PRN Moderate Pain (4 - 6)  oxyCODONE    IR 5 milliGRAM(s) Oral every 6 hours PRN Severe Pain (7 - 10)      Vital Signs Last 24 Hrs  T(C): 36.7 (14 May 2025 05:00), Max: 36.7 (14 May 2025 05:00)  T(F): 98 (14 May 2025 05:00), Max: 98 (14 May 2025 05:00)  HR: 63 (14 May 2025 05:00) (63 - 76)  BP: 146/84 (14 May 2025 05:00) (111/78 - 153/84)  BP(mean): 101 (13 May 2025 20:23) (101 - 101)  RR: 17 (14 May 2025 05:00) (17 - 17)  SpO2: 97% (14 May 2025 05:00) (97% - 99%)    Parameters below as of 14 May 2025 05:00  Patient On (Oxygen Delivery Method): room air        PHYSICAL EXAM  General: NAD, resting comfortably  Pulmonary: normal resp effort  Cardiovascular: NSR  Abdominal: soft, nontender, nondistended, incisions c/d/i. ostomy pink and patent with minimal liquid stool  Extremities: WWP, no clubbing/cyanosis/edema  Neuro: A/O x 3, no focal deficits    I&O's Detail    12 May 2025 07:01  -  13 May 2025 07:00  --------------------------------------------------------  IN:    dextrose 5% + sodium chloride 0.45% w/ Additives: 1200 mL  Total IN: 1200 mL    OUT:    Bulb (mL): 120 mL    Colostomy (mL): 190 mL    Indwelling Catheter - Urethral (mL): 2680 mL    Nasogastric/Oral tube (mL): 850 mL    Voided (mL): 200 mL  Total OUT: 4040 mL    Total NET: -2840 mL      13 May 2025 07:01  -  14 May 2025 06:00  --------------------------------------------------------  IN:    dextrose 5% + sodium chloride 0.45% w/ Additives: 2300 mL    IV PiggyBack: 100 mL  Total IN: 2400 mL    OUT:    Colostomy (mL): 70 mL    Indwelling Catheter - Urethral (mL): 3300 mL    Nasogastric/Oral tube (mL): 500 mL  Total OUT: 3870 mL    Total NET: -1470 mL          LABS:                        11.3   7.25  )-----------( 284      ( 13 May 2025 06:00 )             34.3     05-13    137  |  104  |  5[L]  ----------------------------<  121[H]  3.8   |  27  |  0.62    Ca    8.1[L]      13 May 2025 06:00  Phos  2.7     05-13  Mg     1.9     05-13      PT/INR - ( 13 May 2025 06:00 )   PT: 13.7 sec;   INR: 1.17          PTT - ( 13 May 2025 06:00 )  PTT:38.2 sec  Urinalysis Basic - ( 13 May 2025 06:00 )    Color: x / Appearance: x / SG: x / pH: x  Gluc: 121 mg/dL / Ketone: x  / Bili: x / Urobili: x   Blood: x / Protein: x / Nitrite: x   Leuk Esterase: x / RBC: x / WBC x   Sq Epi: x / Non Sq Epi: x / Bacteria: x        RADIOLOGY & ADDITIONAL STUDIES: INTERVAL HPI/OVERNIGHT EVENTS: ostomy with scant liquid output and without gas in bag, -n/-v    STATUS POST:  cystoscopy bilateral stent placement, robotic assisted APR, coccygectomy, end colostomy creation, gracilis flap recon    POST OPERATIVE DAY #: 7    SUBJECTIVE: Patient seen and examined at bedside. +OOBA, pain well controlled with medication. Denies nausea/vomiting. Mild output in ostomy.      MEDICATIONS  (STANDING):  dextrose 5% + sodium chloride 0.45% with potassium chloride 20 mEq/L 1000 milliLiter(s) (100 mL/Hr) IV Continuous <Continuous>  heparin   Injectable 5000 Unit(s) SubCutaneous every 8 hours  pantoprazole  Injectable 40 milliGRAM(s) IV Push daily    MEDICATIONS  (PRN):  HYDROmorphone  Injectable 0.5 milliGRAM(s) IV Push every 6 hours PRN breakthrough pain on the floor  ondansetron Injectable 4 milliGRAM(s) IV Push every 6 hours PRN Nausea and/or Vomiting  oxyCODONE    IR 2.5 milliGRAM(s) Oral every 6 hours PRN Moderate Pain (4 - 6)  oxyCODONE    IR 5 milliGRAM(s) Oral every 6 hours PRN Severe Pain (7 - 10)      Vital Signs Last 24 Hrs  T(C): 36.7 (14 May 2025 05:00), Max: 36.7 (14 May 2025 05:00)  T(F): 98 (14 May 2025 05:00), Max: 98 (14 May 2025 05:00)  HR: 63 (14 May 2025 05:00) (63 - 76)  BP: 146/84 (14 May 2025 05:00) (111/78 - 153/84)  BP(mean): 101 (13 May 2025 20:23) (101 - 101)  RR: 17 (14 May 2025 05:00) (17 - 17)  SpO2: 97% (14 May 2025 05:00) (97% - 99%)    Parameters below as of 14 May 2025 05:00  Patient On (Oxygen Delivery Method): room air        PHYSICAL EXAM  General: NAD, resting comfortably  Pulmonary: normal resp effort  Cardiovascular: NSR  Abdominal: soft, nontender, nondistended, incisions c/d/i. ostomy pink and patent with minimal liquid stool  Extremities: WWP, no clubbing/cyanosis/edema  Neuro: A/O x 3, no focal deficits    I&O's Detail    12 May 2025 07:01  -  13 May 2025 07:00  --------------------------------------------------------  IN:    dextrose 5% + sodium chloride 0.45% w/ Additives: 1200 mL  Total IN: 1200 mL    OUT:    Bulb (mL): 120 mL    Colostomy (mL): 190 mL    Indwelling Catheter - Urethral (mL): 2680 mL    Nasogastric/Oral tube (mL): 850 mL    Voided (mL): 200 mL  Total OUT: 4040 mL    Total NET: -2840 mL      13 May 2025 07:01  -  14 May 2025 06:00  --------------------------------------------------------  IN:    dextrose 5% + sodium chloride 0.45% w/ Additives: 2300 mL    IV PiggyBack: 100 mL  Total IN: 2400 mL    OUT:    Colostomy (mL): 70 mL    Indwelling Catheter - Urethral (mL): 3300 mL    Nasogastric/Oral tube (mL): 500 mL  Total OUT: 3870 mL    Total NET: -1470 mL          LABS:                        11.3   7.25  )-----------( 284      ( 13 May 2025 06:00 )             34.3     05-13    137  |  104  |  5[L]  ----------------------------<  121[H]  3.8   |  27  |  0.62    Ca    8.1[L]      13 May 2025 06:00  Phos  2.7     05-13  Mg     1.9     05-13      PT/INR - ( 13 May 2025 06:00 )   PT: 13.7 sec;   INR: 1.17          PTT - ( 13 May 2025 06:00 )  PTT:38.2 sec  Urinalysis Basic - ( 13 May 2025 06:00 )    Color: x / Appearance: x / SG: x / pH: x  Gluc: 121 mg/dL / Ketone: x  / Bili: x / Urobili: x   Blood: x / Protein: x / Nitrite: x   Leuk Esterase: x / RBC: x / WBC x   Sq Epi: x / Non Sq Epi: x / Bacteria: x        RADIOLOGY & ADDITIONAL STUDIES:

## 2025-05-15 ENCOUNTER — APPOINTMENT (OUTPATIENT)
Dept: COLORECTAL SURGERY | Facility: HOSPITAL | Age: 60
End: 2025-05-15

## 2025-05-15 ENCOUNTER — TRANSCRIPTION ENCOUNTER (OUTPATIENT)
Age: 60
End: 2025-05-15

## 2025-05-15 LAB
ANION GAP SERPL CALC-SCNC: 11 MMOL/L — SIGNIFICANT CHANGE UP (ref 5–17)
APTT BLD: 36 SEC — SIGNIFICANT CHANGE UP (ref 26.1–36.8)
BLD GP AB SCN SERPL QL: NEGATIVE — SIGNIFICANT CHANGE UP
BUN SERPL-MCNC: 3 MG/DL — LOW (ref 7–23)
CALCIUM SERPL-MCNC: 8.6 MG/DL — SIGNIFICANT CHANGE UP (ref 8.4–10.5)
CHLORIDE SERPL-SCNC: 102 MMOL/L — SIGNIFICANT CHANGE UP (ref 96–108)
CO2 SERPL-SCNC: 24 MMOL/L — SIGNIFICANT CHANGE UP (ref 22–31)
CREAT SERPL-MCNC: 0.67 MG/DL — SIGNIFICANT CHANGE UP (ref 0.5–1.3)
EGFR: 108 ML/MIN/1.73M2 — SIGNIFICANT CHANGE UP
EGFR: 108 ML/MIN/1.73M2 — SIGNIFICANT CHANGE UP
GLUCOSE SERPL-MCNC: 115 MG/DL — HIGH (ref 70–99)
HCT VFR BLD CALC: 35.9 % — LOW (ref 39–50)
HGB BLD-MCNC: 11.6 G/DL — LOW (ref 13–17)
INR BLD: 1.29 — HIGH (ref 0.85–1.16)
MAGNESIUM SERPL-MCNC: 1.9 MG/DL — SIGNIFICANT CHANGE UP (ref 1.6–2.6)
MCHC RBC-ENTMCNC: 29.3 PG — SIGNIFICANT CHANGE UP (ref 27–34)
MCHC RBC-ENTMCNC: 32.3 G/DL — SIGNIFICANT CHANGE UP (ref 32–36)
MCV RBC AUTO: 90.7 FL — SIGNIFICANT CHANGE UP (ref 80–100)
NRBC BLD AUTO-RTO: 0 /100 WBCS — SIGNIFICANT CHANGE UP (ref 0–0)
PHOSPHATE SERPL-MCNC: 3.2 MG/DL — SIGNIFICANT CHANGE UP (ref 2.5–4.5)
PLATELET # BLD AUTO: 333 K/UL — SIGNIFICANT CHANGE UP (ref 150–400)
POTASSIUM SERPL-MCNC: 4.1 MMOL/L — SIGNIFICANT CHANGE UP (ref 3.5–5.3)
POTASSIUM SERPL-SCNC: 4.1 MMOL/L — SIGNIFICANT CHANGE UP (ref 3.5–5.3)
PROTHROM AB SERPL-ACNC: 15.1 SEC — HIGH (ref 9.9–13.4)
RBC # BLD: 3.96 M/UL — LOW (ref 4.2–5.8)
RBC # FLD: 15 % — HIGH (ref 10.3–14.5)
RH IG SCN BLD-IMP: POSITIVE — SIGNIFICANT CHANGE UP
SODIUM SERPL-SCNC: 137 MMOL/L — SIGNIFICANT CHANGE UP (ref 135–145)
WBC # BLD: 11.06 K/UL — HIGH (ref 3.8–10.5)
WBC # FLD AUTO: 11.06 K/UL — HIGH (ref 3.8–10.5)

## 2025-05-15 PROCEDURE — 44180 LAP ENTEROLYSIS: CPT | Mod: 78,GC

## 2025-05-15 RX ORDER — ACETAMINOPHEN 500 MG/5ML
1000 LIQUID (ML) ORAL EVERY 6 HOURS
Refills: 0 | Status: COMPLETED | OUTPATIENT
Start: 2025-05-15 | End: 2025-05-16

## 2025-05-15 RX ORDER — SODIUM CHLORIDE 9 G/1000ML
1000 INJECTION, SOLUTION INTRAVENOUS
Refills: 0 | Status: DISCONTINUED | OUTPATIENT
Start: 2025-05-15 | End: 2025-05-19

## 2025-05-15 RX ORDER — HEPARIN SODIUM 1000 [USP'U]/ML
5000 INJECTION INTRAVENOUS; SUBCUTANEOUS EVERY 8 HOURS
Refills: 0 | Status: DISCONTINUED | OUTPATIENT
Start: 2025-05-15 | End: 2025-05-19

## 2025-05-15 RX ORDER — KETOROLAC TROMETHAMINE 30 MG/ML
15 INJECTION, SOLUTION INTRAMUSCULAR; INTRAVENOUS EVERY 6 HOURS
Refills: 0 | Status: DISCONTINUED | OUTPATIENT
Start: 2025-05-15 | End: 2025-05-15

## 2025-05-15 RX ORDER — HYDROMORPHONE/SOD CHLOR,ISO/PF 2 MG/10 ML
0.25 SYRINGE (ML) INJECTION EVERY 4 HOURS
Refills: 0 | Status: DISCONTINUED | OUTPATIENT
Start: 2025-05-15 | End: 2025-05-19

## 2025-05-15 RX ORDER — BUPIVACAINE 13.3 MG/ML
20 INJECTION, SUSPENSION, LIPOSOMAL INFILTRATION ONCE
Refills: 0 | Status: DISCONTINUED | OUTPATIENT
Start: 2025-05-15 | End: 2025-05-15

## 2025-05-15 RX ORDER — ONDANSETRON HCL/PF 4 MG/2 ML
4 VIAL (ML) INJECTION EVERY 6 HOURS
Refills: 0 | Status: DISCONTINUED | OUTPATIENT
Start: 2025-05-15 | End: 2025-05-19

## 2025-05-15 RX ORDER — MAGNESIUM SULFATE 500 MG/ML
1 SYRINGE (ML) INJECTION ONCE
Refills: 0 | Status: COMPLETED | OUTPATIENT
Start: 2025-05-15 | End: 2025-05-15

## 2025-05-15 RX ORDER — HYDROMORPHONE/SOD CHLOR,ISO/PF 2 MG/10 ML
0.5 SYRINGE (ML) INJECTION
Refills: 0 | Status: DISCONTINUED | OUTPATIENT
Start: 2025-05-15 | End: 2025-05-19

## 2025-05-15 RX ORDER — ACETAMINOPHEN 500 MG/5ML
1000 LIQUID (ML) ORAL EVERY 6 HOURS
Refills: 0 | Status: DISCONTINUED | OUTPATIENT
Start: 2025-05-15 | End: 2025-05-15

## 2025-05-15 RX ADMIN — POTASSIUM CHLORIDE, DEXTROSE MONOHYDRATE AND SODIUM CHLORIDE 100 MILLILITER(S): 150; 5; 900 INJECTION, SOLUTION INTRAVENOUS at 09:35

## 2025-05-15 RX ADMIN — Medication 40 MILLIGRAM(S): at 05:56

## 2025-05-15 RX ADMIN — SODIUM CHLORIDE 95 MILLILITER(S): 9 INJECTION, SOLUTION INTRAVENOUS at 16:28

## 2025-05-15 RX ADMIN — Medication 400 MILLIGRAM(S): at 18:26

## 2025-05-15 RX ADMIN — Medication 0.25 MILLIGRAM(S): at 15:19

## 2025-05-15 RX ADMIN — Medication 0.25 MILLIGRAM(S): at 15:35

## 2025-05-15 RX ADMIN — HEPARIN SODIUM 5000 UNIT(S): 1000 INJECTION INTRAVENOUS; SUBCUTANEOUS at 05:56

## 2025-05-15 RX ADMIN — HEPARIN SODIUM 5000 UNIT(S): 1000 INJECTION INTRAVENOUS; SUBCUTANEOUS at 21:38

## 2025-05-15 RX ADMIN — Medication 40 MILLIGRAM(S): at 19:09

## 2025-05-15 RX ADMIN — Medication 100 GRAM(S): at 09:32

## 2025-05-15 NOTE — BRIEF OPERATIVE NOTE - FIRST ASSIST NAME
Rupal Daniels (Physician Assistant)
Inga Figueroa (Resident)
Paige Kingsley (Physician Assistant)
Ozzie Chacon (Resident)

## 2025-05-15 NOTE — PRE-ANESTHESIA EVALUATION ADULT - NSANTHOSAYNRD_GEN_A_CORE
No. JAY screening performed.  STOP BANG Legend: 0-2 = LOW Risk; 3-4 = INTERMEDIATE Risk; 5-8 = HIGH Risk
No. JAY screening performed.  STOP BANG Legend: 0-2 = LOW Risk; 3-4 = INTERMEDIATE Risk; 5-8 = HIGH Risk

## 2025-05-15 NOTE — BRIEF OPERATIVE NOTE - NSICDXBRIEFPOSTOP_GEN_ALL_CORE_FT
POST-OP DIAGNOSIS:  Rectal cancer 06-May-2025 09:14:06  Ozzie Chacon  
POST-OP DIAGNOSIS:  Rectal cancer 06-May-2025 09:14:06  Ozzie Chacon  
POST-OP DIAGNOSIS:  Rectal cancer 06-May-2025 09:14:06  Ozzie Chacon  SBO (small bowel obstruction) 15-May-2025 13:57:20  Inga Figueroa  
POST-OP DIAGNOSIS:  Rectal cancer 06-May-2025 09:14:06  Ozzie Chacon

## 2025-05-15 NOTE — PROGRESS NOTE ADULT - SUBJECTIVE AND OBJECTIVE BOX
Procedure: dx lap and CHRIS on 5/15  Surgeon: Dr. Barth    S: Pt has no complaints. Denies CP, SOB, DIALLO, calf tenderness. Pain controlled with medication.    O:  T(C): 36.8 (05-15-25 @ 16:21), Max: 36.8 (05-15-25 @ 16:21)  T(F): 98.2 (05-15-25 @ 16:21), Max: 98.2 (05-15-25 @ 16:21)  HR: 67 (05-15-25 @ 16:21) (63 - 70)  BP: 146/82 (05-15-25 @ 16:21) (127/77 - 146/82)  RR: 17 (05-15-25 @ 16:21) (14 - 17)  SpO2: 97% (05-15-25 @ 16:21) (97% - 100%)  Wt(kg): --                        11.6   11.06 )-----------( 333      ( 15 May 2025 06:45 )             35.9     05-15    137  |  102  |  3[L]  ----------------------------<  115[H]  4.1   |  24  |  0.67    Ca    8.6      15 May 2025 06:45  Phos  3.2     05-15  Mg     1.9     05-15        Gen: NAD, resting comfortably in bed  HEENT: NGT thin gastric output  C/V: NSR  Pulm: Nonlabored breathing, no respiratory distress  Abd: soft, NT/ND Incisions CDI, ostomy PPP scant output  Extrem: WWP, no calf edema, SCDs in place

## 2025-05-15 NOTE — BRIEF OPERATIVE NOTE - OPERATION/FINDINGS
Cystoscopy and stents per urology. Veress needle access. Optiview placement of 8mm port LUQ. Rest of ports 8mm x3 and 5mm x2 placed under direct visualization. Bilateral ureters identified and preserved. Medial to lateral dissection of sigmoid colon. High ligation of NANCY. Total mesorectal excision performed identifying area of perforation distally. Proximal margin taken with 8mm blue load robotic stapler. ICG confirmed healthy flow. Brought up descending through left rectus. Closed skin with 4-0 monocryl and dermabond. Matured LUQ end colostomy. Flipped patient and posterior approach to coccygectomy performed with midline incision over coccyx. Circumferential dissection of sphincter complex with care to avoid urethra. Specimen removed. Posterior aspect partially closed with deep 2-0 vicryl, deep dermal 3-0 vicryl, subcuticular 4-0 monocryl. Remainder of case per plastics.
Left gracilis flap reconstruction
Diagnostic laparoscopy revealing small bowel behind and lateral to colostomy with band of adhesive tissue between small bowel mesentery and colon. Adhesion taken down and small bowel replaced to natural location so it is no longer obstructed. Closed R sided Johnny site with two 0 vicryl figure 8s.
Cysto, urethra WNL, median lobar hypertrophy. urine for culture, no masses but with trabeculations and small diverticuli, small bladder stones removed via sheath. B/L 5Fr u-cath of b/l ureters. 10cc of ICG in each cath. 16Fr freeman and catheters internalized to freeman. Connected to darinage.

## 2025-05-15 NOTE — PROGRESS NOTE ADULT - ASSESSMENT
59 year old male with PMH of rectal cancer now s/p cystoscopy bilateral stent placement, robotic assisted APR, coccygectomy, end colostomy creation, L gracilis flap recon (5/6). Now s/p dx lap and CHRIS on 5/15.    NPO/mIVF  NGT LIWS  PPI  Pain/nausea control PRN  DVT ppx  [facility]leton  Plastics recs: shuffle gait, no sitting, no adduction  AM labs  Dispo: Outpatient PT

## 2025-05-15 NOTE — PROGRESS NOTE ADULT - ASSESSMENT
59 year old male with PMH of rectal cancer now s/p cystoscopy bilateral stent placement, robotic assisted APR, coccygectomy, end colostomy creation, L gracilis flap recon (5/6).    To OR 5/15 dx lap, CHRIS  NPO/mIVF  NGT LIWS  PPI  Pain/nausea control PRN  DVT ppx  [place]leton  Plastics recs: shuffle gait, no sitting, no adduction  AM labs  Dispo: Outpatient PT

## 2025-05-15 NOTE — PROGRESS NOTE ADULT - SUBJECTIVE AND OBJECTIVE BOX
STATUS POST:  cystoscopy bilateral stent placement, robotic assisted APR, coccygectomy, end colostomy creation, gracilis flap recon    POST OPERATIVE DAY #: 8    SUBJECTIVE:      MEDICATIONS  (STANDING):  dextrose 5% + sodium chloride 0.45% with potassium chloride 20 mEq/L 1000 milliLiter(s) (100 mL/Hr) IV Continuous <Continuous>  heparin   Injectable 5000 Unit(s) SubCutaneous every 8 hours  pantoprazole  Injectable 40 milliGRAM(s) IV Push every 12 hours    MEDICATIONS  (PRN):  acetaminophen   IVPB .. 1000 milliGRAM(s) IV Intermittent every 6 hours PRN Mild Pain (1 - 3)  HYDROmorphone  Injectable 0.5 milliGRAM(s) IV Push every 6 hours PRN breakthrough pain on the floor  ondansetron Injectable 4 milliGRAM(s) IV Push every 6 hours PRN Nausea and/or Vomiting  oxyCODONE    IR 2.5 milliGRAM(s) Oral every 6 hours PRN Moderate Pain (4 - 6)  oxyCODONE    IR 5 milliGRAM(s) Oral every 6 hours PRN Severe Pain (7 - 10)      Vital Signs Last 24 Hrs  T(C): 36.7 (15 May 2025 05:49), Max: 36.9 (14 May 2025 08:37)  T(F): 98.1 (15 May 2025 05:49), Max: 98.4 (14 May 2025 08:37)  HR: 61 (15 May 2025 05:49) (57 - 72)  BP: 130/81 (15 May 2025 05:49) (124/81 - 142/78)  BP(mean): 98 (14 May 2025 20:34) (98 - 98)  RR: 18 (15 May 2025 05:49) (16 - 18)  SpO2: 97% (15 May 2025 05:49) (97% - 99%)    Parameters below as of 15 May 2025 05:49  Patient On (Oxygen Delivery Method): room air        PHYSICAL EXAM  General: NAD, resting comfortably  HEENT: NGT in place LIWS  Pulmonary: normal resp effort  Cardiovascular: NSR  Abdominal: soft, nontender, nondistended, incisions c/d/i. ostomy pink and patent with minimal liquid stool  Extremities: WWP, no clubbing/cyanosis/edema  Neuro: A/O x 3, no focal deficits    I&O's Detail    13 May 2025 07:01  -  14 May 2025 07:00  --------------------------------------------------------  IN:    dextrose 5% + sodium chloride 0.45% w/ Additives: 2400 mL    IV PiggyBack: 100 mL  Total IN: 2500 mL    OUT:    Colostomy (mL): 70 mL    Indwelling Catheter - Urethral (mL): 3775 mL    Nasogastric/Oral tube (mL): 600 mL  Total OUT: 4445 mL    Total NET: -1945 mL      14 May 2025 07:01  -  15 May 2025 06:15  --------------------------------------------------------  IN:    dextrose 5% + sodium chloride 0.45% w/ Additives: 2200 mL  Total IN: 2200 mL    OUT:    Colostomy (mL): 10 mL    Indwelling Catheter - Urethral (mL): 1900 mL    Nasogastric/Oral tube (mL): 600 mL  Total OUT: 2510 mL    Total NET: -310 mL          LABS:                        12.0   7.79  )-----------( 311      ( 14 May 2025 06:10 )             36.5     05-14    139  |  105  |  3[L]  ----------------------------<  120[H]  4.0   |  27  |  0.64    Ca    8.5      14 May 2025 06:10  Phos  2.9     05-14  Mg     2.0     05-14        Urinalysis Basic - ( 14 May 2025 06:10 )    Color: x / Appearance: x / SG: x / pH: x  Gluc: 120 mg/dL / Ketone: x  / Bili: x / Urobili: x   Blood: x / Protein: x / Nitrite: x   Leuk Esterase: x / RBC: x / WBC x   Sq Epi: x / Non Sq Epi: x / Bacteria: x        RADIOLOGY & ADDITIONAL STUDIES: STATUS POST:  cystoscopy bilateral stent placement, robotic assisted APR, coccygectomy, end colostomy creation, gracilis flap recon    POST OPERATIVE DAY #: 8    SUBJECTIVE: Patient seen and examined at bedside. Pain well controlled, no nausea/vomiting, reports scant output per ostomy.       MEDICATIONS  (STANDING):  dextrose 5% + sodium chloride 0.45% with potassium chloride 20 mEq/L 1000 milliLiter(s) (100 mL/Hr) IV Continuous <Continuous>  heparin   Injectable 5000 Unit(s) SubCutaneous every 8 hours  pantoprazole  Injectable 40 milliGRAM(s) IV Push every 12 hours    MEDICATIONS  (PRN):  acetaminophen   IVPB .. 1000 milliGRAM(s) IV Intermittent every 6 hours PRN Mild Pain (1 - 3)  HYDROmorphone  Injectable 0.5 milliGRAM(s) IV Push every 6 hours PRN breakthrough pain on the floor  ondansetron Injectable 4 milliGRAM(s) IV Push every 6 hours PRN Nausea and/or Vomiting  oxyCODONE    IR 2.5 milliGRAM(s) Oral every 6 hours PRN Moderate Pain (4 - 6)  oxyCODONE    IR 5 milliGRAM(s) Oral every 6 hours PRN Severe Pain (7 - 10)      Vital Signs Last 24 Hrs  T(C): 36.7 (15 May 2025 05:49), Max: 36.9 (14 May 2025 08:37)  T(F): 98.1 (15 May 2025 05:49), Max: 98.4 (14 May 2025 08:37)  HR: 61 (15 May 2025 05:49) (57 - 72)  BP: 130/81 (15 May 2025 05:49) (124/81 - 142/78)  BP(mean): 98 (14 May 2025 20:34) (98 - 98)  RR: 18 (15 May 2025 05:49) (16 - 18)  SpO2: 97% (15 May 2025 05:49) (97% - 99%)    Parameters below as of 15 May 2025 05:49  Patient On (Oxygen Delivery Method): room air        PHYSICAL EXAM  General: NAD, resting comfortably  HEENT: NGT in place LIWS  Pulmonary: normal resp effort  Cardiovascular: NSR  Abdominal: soft, nontender, nondistended, incisions c/d/i. ostomy pink and patent with minimal liquid stool  Extremities: WWP, no clubbing/cyanosis/edema  Neuro: A/O x 3, no focal deficits    I&O's Detail    13 May 2025 07:01  -  14 May 2025 07:00  --------------------------------------------------------  IN:    dextrose 5% + sodium chloride 0.45% w/ Additives: 2400 mL    IV PiggyBack: 100 mL  Total IN: 2500 mL    OUT:    Colostomy (mL): 70 mL    Indwelling Catheter - Urethral (mL): 3775 mL    Nasogastric/Oral tube (mL): 600 mL  Total OUT: 4445 mL    Total NET: -1945 mL      14 May 2025 07:01  -  15 May 2025 06:15  --------------------------------------------------------  IN:    dextrose 5% + sodium chloride 0.45% w/ Additives: 2200 mL  Total IN: 2200 mL    OUT:    Colostomy (mL): 10 mL    Indwelling Catheter - Urethral (mL): 1900 mL    Nasogastric/Oral tube (mL): 600 mL  Total OUT: 2510 mL    Total NET: -310 mL          LABS:                        12.0   7.79  )-----------( 311      ( 14 May 2025 06:10 )             36.5     05-14    139  |  105  |  3[L]  ----------------------------<  120[H]  4.0   |  27  |  0.64    Ca    8.5      14 May 2025 06:10  Phos  2.9     05-14  Mg     2.0     05-14        Urinalysis Basic - ( 14 May 2025 06:10 )    Color: x / Appearance: x / SG: x / pH: x  Gluc: 120 mg/dL / Ketone: x  / Bili: x / Urobili: x   Blood: x / Protein: x / Nitrite: x   Leuk Esterase: x / RBC: x / WBC x   Sq Epi: x / Non Sq Epi: x / Bacteria: x        RADIOLOGY & ADDITIONAL STUDIES:

## 2025-05-15 NOTE — PRE-ANESTHESIA EVALUATION ADULT - NSANTHPEFT_GEN_ALL_CORE
Respiratory: CTAB, unlabored breathing, no retractions  CV: +S1/S2, RRR
General: AAOx3, NAD  Eyes: The sclera and conjunctiva normal, pupils equal in size.  ENT: The ears and nose were normal in appearance; oropharynx clear, moist mucus membranes.  Neck: The appearance of the neck was normal, with no gross masses or nodules.  Respiratory: Unlabored, no retractions.  CV: RRR  Neurological: No focal deficit, moves all extremities.  Exercise Tolerance:  METS>4.

## 2025-05-15 NOTE — BRIEF OPERATIVE NOTE - NSICDXBRIEFPREOP_GEN_ALL_CORE_FT
PRE-OP DIAGNOSIS:  Rectal cancer 06-May-2025 09:14:02  Ozzie Chacon  SBO (small bowel obstruction) 15-May-2025 13:57:12  Inga Figueroa  
PRE-OP DIAGNOSIS:  Rectal cancer 06-May-2025 09:14:02  Ozzie Chacon  

## 2025-05-15 NOTE — PRE-ANESTHESIA EVALUATION ADULT - NSANTHAIRWAYFT_ENT_ALL_CORE
hyomental distance >3 cm, mouth opening 3 FB.
Normal thyromental distance, normal neck range of motion, and good mouth opening.

## 2025-05-16 LAB
ANION GAP SERPL CALC-SCNC: 10 MMOL/L — SIGNIFICANT CHANGE UP (ref 5–17)
BUN SERPL-MCNC: 6 MG/DL — LOW (ref 7–23)
CALCIUM SERPL-MCNC: 8.8 MG/DL — SIGNIFICANT CHANGE UP (ref 8.4–10.5)
CHLORIDE SERPL-SCNC: 102 MMOL/L — SIGNIFICANT CHANGE UP (ref 96–108)
CO2 SERPL-SCNC: 25 MMOL/L — SIGNIFICANT CHANGE UP (ref 22–31)
CREAT SERPL-MCNC: 0.69 MG/DL — SIGNIFICANT CHANGE UP (ref 0.5–1.3)
EGFR: 107 ML/MIN/1.73M2 — SIGNIFICANT CHANGE UP
EGFR: 107 ML/MIN/1.73M2 — SIGNIFICANT CHANGE UP
GLUCOSE SERPL-MCNC: 120 MG/DL — HIGH (ref 70–99)
HCT VFR BLD CALC: 37.6 % — LOW (ref 39–50)
HGB BLD-MCNC: 12.2 G/DL — LOW (ref 13–17)
MAGNESIUM SERPL-MCNC: 2.3 MG/DL — SIGNIFICANT CHANGE UP (ref 1.6–2.6)
MCHC RBC-ENTMCNC: 29.8 PG — SIGNIFICANT CHANGE UP (ref 27–34)
MCHC RBC-ENTMCNC: 32.4 G/DL — SIGNIFICANT CHANGE UP (ref 32–36)
MCV RBC AUTO: 91.7 FL — SIGNIFICANT CHANGE UP (ref 80–100)
NRBC BLD AUTO-RTO: 0 /100 WBCS — SIGNIFICANT CHANGE UP (ref 0–0)
PHOSPHATE SERPL-MCNC: 4 MG/DL — SIGNIFICANT CHANGE UP (ref 2.5–4.5)
PLATELET # BLD AUTO: 350 K/UL — SIGNIFICANT CHANGE UP (ref 150–400)
POTASSIUM SERPL-MCNC: 4.7 MMOL/L — SIGNIFICANT CHANGE UP (ref 3.5–5.3)
POTASSIUM SERPL-SCNC: 4.7 MMOL/L — SIGNIFICANT CHANGE UP (ref 3.5–5.3)
RBC # BLD: 4.1 M/UL — LOW (ref 4.2–5.8)
RBC # FLD: 15.2 % — HIGH (ref 10.3–14.5)
SODIUM SERPL-SCNC: 137 MMOL/L — SIGNIFICANT CHANGE UP (ref 135–145)
WBC # BLD: 7.53 K/UL — SIGNIFICANT CHANGE UP (ref 3.8–10.5)
WBC # FLD AUTO: 7.53 K/UL — SIGNIFICANT CHANGE UP (ref 3.8–10.5)

## 2025-05-16 RX ORDER — ACETAMINOPHEN 500 MG/5ML
1000 LIQUID (ML) ORAL EVERY 8 HOURS
Refills: 0 | Status: COMPLETED | OUTPATIENT
Start: 2025-05-16 | End: 2025-05-17

## 2025-05-16 RX ADMIN — Medication 40 MILLIGRAM(S): at 06:34

## 2025-05-16 RX ADMIN — Medication 400 MILLIGRAM(S): at 00:00

## 2025-05-16 RX ADMIN — HEPARIN SODIUM 5000 UNIT(S): 1000 INJECTION INTRAVENOUS; SUBCUTANEOUS at 22:39

## 2025-05-16 RX ADMIN — SODIUM CHLORIDE 95 MILLILITER(S): 9 INJECTION, SOLUTION INTRAVENOUS at 13:11

## 2025-05-16 RX ADMIN — HEPARIN SODIUM 5000 UNIT(S): 1000 INJECTION INTRAVENOUS; SUBCUTANEOUS at 14:19

## 2025-05-16 RX ADMIN — Medication 40 MILLIGRAM(S): at 18:01

## 2025-05-16 RX ADMIN — Medication 400 MILLIGRAM(S): at 12:03

## 2025-05-16 RX ADMIN — Medication 400 MILLIGRAM(S): at 22:39

## 2025-05-16 RX ADMIN — Medication 400 MILLIGRAM(S): at 06:33

## 2025-05-16 RX ADMIN — HEPARIN SODIUM 5000 UNIT(S): 1000 INJECTION INTRAVENOUS; SUBCUTANEOUS at 06:34

## 2025-05-16 NOTE — PROGRESS NOTE ADULT - ASSESSMENT
59 year old male with PMH of rectal cancer now s/p cystoscopy bilateral stent placement, robotic assisted APR, coccygectomy, end colostomy creation, L gracilis flap recon (5/6). Now s/p dx lap and CHRIS on 5/15.    NPO/mIVF  NGT LIWS  PPI  Pending TOV  Pain/nausea control PRN  DVT ppx  Plastics recs: shuffle gait, no sitting, no adduction  AM labs  Dispo: Outpatient PT

## 2025-05-16 NOTE — PROGRESS NOTE ADULT - SUBJECTIVE AND OBJECTIVE BOX
INTERVAL HPI/OVERNIGHT EVENTS: ostomy no stool or gas, leonardo-incisional ttp, freeman clear, yellow. -n/-v.     STATUS POST:  5/6: cystoscopy bilateral stent placement, robotic assisted APR, coccygectomy, end colostomy creation, gracilis flap recon  5/15: RTOR for lap CHRIS    SUBJECTIVE: Patient seen and examined with chief resident on AM rounds. Patient has no acute complaints at this time. Pt states his pain is well controlled on current regimen. Denies ostomy function and endorses some burping. Denies n/v/f/c/sob/cp.     MEDICATIONS  (STANDING):  acetaminophen   IVPB .. 1000 milliGRAM(s) IV Intermittent every 6 hours  dextrose 5% + sodium chloride 0.45%. 1000 milliLiter(s) (95 mL/Hr) IV Continuous <Continuous>  heparin   Injectable 5000 Unit(s) SubCutaneous every 8 hours  pantoprazole  Injectable 40 milliGRAM(s) IV Push every 12 hours    MEDICATIONS  (PRN):  HYDROmorphone  Injectable 0.5 milliGRAM(s) IV Push every 30 minutes PRN Severe Pain (7 - 10)  HYDROmorphone  Injectable 0.25 milliGRAM(s) IV Push every 4 hours PRN Moderate Pain (4 - 6)  ketorolac   Injectable 15 milliGRAM(s) IV Push every 6 hours PRN Mild Pain (1 - 3)  ondansetron Injectable 4 milliGRAM(s) IV Push every 6 hours PRN Nausea and/or Vomiting      Vital Signs Last 24 Hrs  T(C): 36.3 (16 May 2025 04:58), Max: 36.8 (15 May 2025 08:29)  T(F): 97.4 (16 May 2025 04:58), Max: 98.3 (15 May 2025 08:29)  HR: 57 (16 May 2025 04:58) (57 - 70)  BP: 144/84 (16 May 2025 04:58) (116/69 - 146/82)  BP(mean): 98 (15 May 2025 16:05) (88 - 103)  RR: 17 (16 May 2025 04:58) (12 - 17)  SpO2: 98% (16 May 2025 04:58) (97% - 100%)    Parameters below as of 16 May 2025 04:58  Patient On (Oxygen Delivery Method): room air        PHYSICAL EXAM:  Constitutional: A&Ox3  Respiratory: non labored breathing, no respiratory distress  Cardiovascular: NSR, RRR  Gastrointestinal: Abd soft, NT/ND. Stoma PPP with bowel sweat                 Incision: c/d/i  Genitourinary: freeman draining clear yellow urine  Extremities: (-) edema, WWP            I&O's Detail    15 May 2025 07:01  -  16 May 2025 07:00  --------------------------------------------------------  IN:    dextrose 5% + sodium chloride 0.45%: 1045 mL    dextrose 5% + sodium chloride 0.45% w/ Additives: 600 mL  Total IN: 1645 mL    OUT:    Colostomy (mL): 0 mL    Indwelling Catheter - Urethral (mL): 2940 mL    Nasogastric/Oral tube (mL): 100 mL  Total OUT: 3040 mL    Total NET: -1395 mL      16 May 2025 07:01  -  16 May 2025 08:18  --------------------------------------------------------  IN:    dextrose 5% + sodium chloride 0.45%: 95 mL  Total IN: 95 mL    OUT:  Total OUT: 0 mL    Total NET: 95 mL          LABS:                        12.2   7.53  )-----------( 350      ( 16 May 2025 07:15 )             37.6     05-15    137  |  102  |  3[L]  ----------------------------<  115[H]  4.1   |  24  |  0.67    Ca    8.6      15 May 2025 06:45  Phos  3.2     05-15  Mg     1.9     05-15      PT/INR - ( 15 May 2025 06:45 )   PT: 15.1 sec;   INR: 1.29          PTT - ( 15 May 2025 06:45 )  PTT:36.0 sec  Urinalysis Basic - ( 15 May 2025 06:45 )    Color: x / Appearance: x / SG: x / pH: x  Gluc: 115 mg/dL / Ketone: x  / Bili: x / Urobili: x   Blood: x / Protein: x / Nitrite: x   Leuk Esterase: x / RBC: x / WBC x   Sq Epi: x / Non Sq Epi: x / Bacteria: x        RADIOLOGY & ADDITIONAL STUDIES:

## 2025-05-16 NOTE — CHART NOTE - NSCHARTNOTEFT_GEN_A_CORE
Admitting Diagnosis:   Patient is a 59y old  Male who presents with a chief complaint of rectal cancer (16 May 2025 08:18)      PAST MEDICAL & SURGICAL HISTORY:  Malignant neoplasm of rectum  chemo, radiation 12/2024  Port-A-Cath in place  left 2024      Current Nutrition Order:  Diet, NPO (05-15-25 @ 13:55) [Active]     PO Intake: Good (%) [   ]  Fair (50-75%) [   ] Poor (<25%) [   ] ... remains NPO ***    GI Issues: denies nausea/ vomiting/ diarrhea but complains of abdominal discomfort at surgical site; pending return of bowel function status post lysis of adhesions yesterday     Pain: denies pain per flowsheet     Skin Integrity: surgical incision; no edema documented; no pressure injuries documented, pardeep Henderson         05-15-25 @ 07:01  -  05-16-25 @ 07:00  --------------------------------------------------------  IN: 1645 mL / OUT: 3040 mL / NET: -1395 mL    05-16-25 @ 07:01  -  05-16-25 @ 16:23  --------------------------------------------------------  IN: 931 mL / OUT: 850 mL / NET: 81 mL        Labs:   05-16    137  |  102  |  6[L]  ----------------------------<  120[H]  4.7   |  25  |  0.69    Ca    8.8      16 May 2025 07:15  Phos  4.0     05-16  Mg     2.3     05-16        Medications:  MEDICATIONS  (STANDING):  dextrose 5% + sodium chloride 0.45%. 1000 milliLiter(s) (95 mL/Hr) IV Continuous <Continuous>  heparin   Injectable 5000 Unit(s) SubCutaneous every 8 hours  pantoprazole  Injectable 40 milliGRAM(s) IV Push every 12 hours    MEDICATIONS  (PRN):  HYDROmorphone  Injectable 0.5 milliGRAM(s) IV Push every 30 minutes PRN Severe Pain (7 - 10)  HYDROmorphone  Injectable 0.25 milliGRAM(s) IV Push every 4 hours PRN Moderate Pain (4 - 6)  ketorolac   Injectable 15 milliGRAM(s) IV Push every 6 hours PRN Mild Pain (1 - 3)  ondansetron Injectable 4 milliGRAM(s) IV Push every 6 hours PRN Nausea and/or Vomiting      Height for BMI (FEET)	5 Feet  Height for BMI (INCHES)	6 Inch(s)  Height for BMI (CENTIMETERS)	167.64 Centimeter(s)  Weight for BMI (lbs)	121 lb  Weight for BMI (kg)	54.9 kg  Body Mass Index	19.5  Ideal body weight 142 pounds / 64.5 kg (85%)     Weight Change: No new wt since admit.     Estimated energy needs:   Estimated Energy Needs Weight (lbs)	121 lb  Estimated Energy Needs Weight (kg)	54.8 kg  Estimated Energy Needs From (gino/kg)	30  Estimated Energy Needs To (gino/kg)	35  Estimated Energy Needs Calculated From (gino/kg)	1644  Estimated Energy Needs Calculated To (gino/kg)	1918    Estimated Protein Needs Weight (lbs)	121 lb  Estimated Protein Needs Weight (kg)	54.8 kg  Estimated Protein Needs From (g/kg)	1.4  Estimated Protein Needs To (g/kg)	1.6  Estimated Protein Needs Calculated From (g/kg)	76.72  Estimated Protein Needs Calculated To (g/kg)	87.68    Estimated Fluid Needs Weight (lbs)	121 lb  Estimated Fluid Needs Weight (kg)	54.8 kg  Estimated Fluid Needs From (ml/kg)	30  Estimated Fluid Needs To (ml/kg)	35  Estimated Fluid Needs Calculated From (ml/kg)	1644  Estimated Fluid Needs Calculated To (ml/kg)	1918    Other Calculations	Based on dosing wt 121 pounds as pt within % IBW (85%). Needs adjusted for post-op healing, hx cancer status post chemo, age.    Subjective: "59 year old male with PMH of rectal cancer now s/p cystoscopy bilateral stent placement, robotic assisted APR, coccygectomy, end colostomy creation, L gracilis flap recon (5/6)."     Patient seen at bedside on 9ur for nutrition follow up. Current diet order: remains NPO but now status post OR yesterday for CHRIS. Observed with NGT to LWS in place upon RD visit this AM. As discussed during interdisciplinary rounds this AM - no longer planned for PICC/ TPN as now awaiting return of bowel function status post surgery per PA. Denies nausea/vomiting/diarrhea but complains of abdominal discomfort. Labs: BUN low, glucose 120 mg/dl. Meds: D5 + IVF, protonix, zofran PRN. See nutrition recommendations below.     Previous Nutrition Diagnosis: Inadequate oral intake related to SBO as evidenced by prolonged NPO status.     Active [ x  ]  Resolved [   ]    Goal: Pt will consume >/= 75% of estimated nutrient needs via tolerated route.     Recommendations:  1. Remains NPO status post OR yesterday - advance diet as tolerated to clear liquids to full liquids to low-fiber diet as medically feasible as per MD orders   >> Once diet advanced, consider oral nutrition supplement should intake be </= 50%   2. *** If unable to advance diet / pt unable to tolerate PO intake within 24 hrs would strongly recommend to initiate nutrition support, see below  -If TPN/PICC placed: goal of 280g dextrose, 85g amino acids, and 50g 20% SMOF lipids to provide in total 1792 kcal (33 kcal/kg dosing wt 54.8kg) and 85g protein (1.6 g/kg dosing wt 54.8kg). GIR = 3.5 (WNL)   >>Start at 100g dex on day 1, 200g dex on day 2, and advance to goal of 280g dex on day 3.  >>Likely at high risk for REFEEDING syndrome. Advance nutrition cautiously. If electrolytes low, consider holding initiation or increase in dextrose until electrolytes are supplemented and/or normalized. Please provide MVI and thiamine in TPN bag.   3. Monitor wt trends, GI function, skin integrity  4. Monitor lytes, renal indices, blood glucose, LFTs    5. Pain and bowel regimen per team   6. Align nutrition with goals of care at all times     Education: reviewed typical diet progression and reviewed low-fiber diet per pt's request. Also discussed possibility of IV nutrition if unable to advance diet, pt receptive.     RD to remain available for additional nutrition interventions as needed.

## 2025-05-17 LAB
ANION GAP SERPL CALC-SCNC: 5 MMOL/L — SIGNIFICANT CHANGE UP (ref 5–17)
BUN SERPL-MCNC: 6 MG/DL — LOW (ref 7–23)
CALCIUM SERPL-MCNC: 8.2 MG/DL — LOW (ref 8.4–10.5)
CHLORIDE SERPL-SCNC: 107 MMOL/L — SIGNIFICANT CHANGE UP (ref 96–108)
CO2 SERPL-SCNC: 27 MMOL/L — SIGNIFICANT CHANGE UP (ref 22–31)
CREAT SERPL-MCNC: 0.79 MG/DL — SIGNIFICANT CHANGE UP (ref 0.5–1.3)
EGFR: 103 ML/MIN/1.73M2 — SIGNIFICANT CHANGE UP
EGFR: 103 ML/MIN/1.73M2 — SIGNIFICANT CHANGE UP
GLUCOSE SERPL-MCNC: 112 MG/DL — HIGH (ref 70–99)
HCT VFR BLD CALC: 32.3 % — LOW (ref 39–50)
HGB BLD-MCNC: 10.6 G/DL — LOW (ref 13–17)
MAGNESIUM SERPL-MCNC: 2 MG/DL — SIGNIFICANT CHANGE UP (ref 1.6–2.6)
MCHC RBC-ENTMCNC: 30.2 PG — SIGNIFICANT CHANGE UP (ref 27–34)
MCHC RBC-ENTMCNC: 32.8 G/DL — SIGNIFICANT CHANGE UP (ref 32–36)
MCV RBC AUTO: 92 FL — SIGNIFICANT CHANGE UP (ref 80–100)
NRBC BLD AUTO-RTO: 0 /100 WBCS — SIGNIFICANT CHANGE UP (ref 0–0)
PHOSPHATE SERPL-MCNC: 3.5 MG/DL — SIGNIFICANT CHANGE UP (ref 2.5–4.5)
PLATELET # BLD AUTO: 335 K/UL — SIGNIFICANT CHANGE UP (ref 150–400)
POTASSIUM SERPL-MCNC: 3.7 MMOL/L — SIGNIFICANT CHANGE UP (ref 3.5–5.3)
POTASSIUM SERPL-SCNC: 3.7 MMOL/L — SIGNIFICANT CHANGE UP (ref 3.5–5.3)
RBC # BLD: 3.51 M/UL — LOW (ref 4.2–5.8)
RBC # FLD: 15.7 % — HIGH (ref 10.3–14.5)
SODIUM SERPL-SCNC: 139 MMOL/L — SIGNIFICANT CHANGE UP (ref 135–145)
WBC # BLD: 8.79 K/UL — SIGNIFICANT CHANGE UP (ref 3.8–10.5)
WBC # FLD AUTO: 8.79 K/UL — SIGNIFICANT CHANGE UP (ref 3.8–10.5)

## 2025-05-17 PROCEDURE — 74019 RADEX ABDOMEN 2 VIEWS: CPT | Mod: 26

## 2025-05-17 RX ORDER — DIATRIZOATE MEGLUMINE, SODIUM 66 %-10 %
30 VIAL (ML) INJECTION ONCE
Refills: 0 | Status: COMPLETED | OUTPATIENT
Start: 2025-05-17 | End: 2025-05-17

## 2025-05-17 RX ADMIN — HEPARIN SODIUM 5000 UNIT(S): 1000 INJECTION INTRAVENOUS; SUBCUTANEOUS at 22:08

## 2025-05-17 RX ADMIN — Medication 400 MILLIGRAM(S): at 06:08

## 2025-05-17 RX ADMIN — Medication 30 MILLILITER(S): at 12:10

## 2025-05-17 RX ADMIN — HEPARIN SODIUM 5000 UNIT(S): 1000 INJECTION INTRAVENOUS; SUBCUTANEOUS at 06:08

## 2025-05-17 RX ADMIN — HEPARIN SODIUM 5000 UNIT(S): 1000 INJECTION INTRAVENOUS; SUBCUTANEOUS at 13:41

## 2025-05-17 RX ADMIN — Medication 100 MILLIEQUIVALENT(S): at 09:49

## 2025-05-17 RX ADMIN — Medication 40 MILLIGRAM(S): at 17:35

## 2025-05-17 RX ADMIN — Medication 40 MILLIGRAM(S): at 06:08

## 2025-05-17 RX ADMIN — Medication 400 MILLIGRAM(S): at 22:07

## 2025-05-17 RX ADMIN — Medication 1000 MILLIGRAM(S): at 14:14

## 2025-05-17 RX ADMIN — Medication 400 MILLIGRAM(S): at 13:44

## 2025-05-17 NOTE — PROGRESS NOTE ADULT - ASSESSMENT
59 year old male with PMH of rectal cancer now s/p cystoscopy bilateral stent placement, robotic assisted APR, coccygectomy, end colostomy creation, L gracilis flap recon (5/6). Now s/p dx lap and CHRIS on 5/15.    NPO/mIVF  NGT LIWS  GG challenge  PPI  Pain/nausea control PRN  DVT ppx  Plastics recs: shuffle gait, no sitting, no adduction  AM labs  Dispo: Outpatient PT      Plan discussed with attending and chief resident.   ____________________________________________________  Milvia Wolf - Resident   Surgery

## 2025-05-17 NOTE — PROGRESS NOTE ADULT - SUBJECTIVE AND OBJECTIVE BOX
HX: POD s/p      SUBJECTIVE: Patient seen and examined bedside by chief resident. Patient refers ___ this morning. Patient reports/denies passing gas, having bowel movement, nausea, vomiting, CP, SOB.    heparin   Injectable 5000 Unit(s) SubCutaneous every 8 hours    MEDICATIONS  (PRN):  HYDROmorphone  Injectable 0.5 milliGRAM(s) IV Push every 30 minutes PRN Severe Pain (7 - 10)  HYDROmorphone  Injectable 0.25 milliGRAM(s) IV Push every 4 hours PRN Moderate Pain (4 - 6)  ketorolac   Injectable 15 milliGRAM(s) IV Push every 6 hours PRN Mild Pain (1 - 3)  ondansetron Injectable 4 milliGRAM(s) IV Push every 6 hours PRN Nausea and/or Vomiting      I&O's Detail    16 May 2025 07:01  -  17 May 2025 07:00  --------------------------------------------------------  IN:    dextrose 5% + sodium chloride 0.45%: 2161 mL    IV PiggyBack: 100 mL  Total IN: 2261 mL    OUT:    Colostomy (mL): 0 mL    Nasogastric/Oral tube (mL): 200 mL    Voided (mL): 1725 mL  Total OUT: 1925 mL    Total NET: 336 mL      17 May 2025 07:01  -  17 May 2025 13:02  --------------------------------------------------------  IN:  Total IN: 0 mL    OUT:    Oral Fluid: 0 mL  Total OUT: 0 mL    Total NET: 0 mL          Vital Signs Last 24 Hrs  T(C): 36.4 (17 May 2025 04:54), Max: 36.4 (17 May 2025 04:54)  T(F): 97.6 (17 May 2025 04:54), Max: 97.6 (17 May 2025 04:54)  HR: 59 (17 May 2025 04:54) (55 - 74)  BP: 145/91 (17 May 2025 04:54) (145/83 - 156/82)  BP(mean): 102 (17 May 2025 04:54) (102 - 102)  RR: 18 (17 May 2025 04:54) (18 - 19)  SpO2: 97% (17 May 2025 04:54) (97% - 100%)    Parameters below as of 17 May 2025 04:54  Patient On (Oxygen Delivery Method): room air        PHYSICAL EXAM:   Gen: NAD, resting comfortably   CV: NSR  Pulm: no respiratory distress on RA, CTAB   Abd: Soft, ND, NTTP, no rebound or guarding   Ext: WWP, no edema   Neuro: motor/sensory grossly intact     LABS:                        10.6   8.79  )-----------( 335      ( 17 May 2025 06:48 )             32.3     05-17    139  |  107  |  6[L]  ----------------------------<  112[H]  3.7   |  27  |  0.79    Ca    8.2[L]      17 May 2025 06:48  Phos  3.5     05-17  Mg     2.0     05-17          CAPILLARY BLOOD GLUCOSE            Urinalysis Basic - ( 17 May 2025 06:48 )    Color: x / Appearance: x / SG: x / pH: x  Gluc: 112 mg/dL / Ketone: x  / Bili: x / Urobili: x   Blood: x / Protein: x / Nitrite: x   Leuk Esterase: x / RBC: x / WBC x   Sq Epi: x / Non Sq Epi: x / Bacteria: x             RADIOLOGY & ADDITIONAL STUDIES:

## 2025-05-18 RX ADMIN — SODIUM CHLORIDE 95 MILLILITER(S): 9 INJECTION, SOLUTION INTRAVENOUS at 17:13

## 2025-05-18 RX ADMIN — HEPARIN SODIUM 5000 UNIT(S): 1000 INJECTION INTRAVENOUS; SUBCUTANEOUS at 05:36

## 2025-05-18 RX ADMIN — HEPARIN SODIUM 5000 UNIT(S): 1000 INJECTION INTRAVENOUS; SUBCUTANEOUS at 22:29

## 2025-05-18 RX ADMIN — Medication 40 MILLIGRAM(S): at 17:13

## 2025-05-18 RX ADMIN — HEPARIN SODIUM 5000 UNIT(S): 1000 INJECTION INTRAVENOUS; SUBCUTANEOUS at 14:30

## 2025-05-18 RX ADMIN — Medication 40 MILLIGRAM(S): at 05:43

## 2025-05-18 NOTE — PROGRESS NOTE ADULT - SUBJECTIVE AND OBJECTIVE BOX
Plastic Surgery Progress Note (pg [facility]: [id number], [place]: [phone])    SUBJECTIVE  The patient was seen and examined. some ooze per pt from sacral wound    OBJECTIVE  ___________________________________________________  VITAL SIGNS / I&O's   Vital Signs Last 24 Hrs  T(C): 36.7 (18 May 2025 08:25), Max: 36.9 (17 May 2025 20:00)  T(F): 98 (18 May 2025 08:25), Max: 98.5 (17 May 2025 20:00)  HR: 63 (18 May 2025 08:25) (60 - 71)  BP: 134/76 (18 May 2025 08:25) (125/79 - 141/78)  BP(mean): --  RR: 18 (18 May 2025 08:25) (17 - 18)  SpO2: 98% (18 May 2025 08:25) (98% - 99%)    Parameters below as of 18 May 2025 08:25  Patient On (Oxygen Delivery Method): room air          17 May 2025 07:01  -  18 May 2025 07:00  --------------------------------------------------------  IN:    dextrose 5% + sodium chloride 0.45%: 1140 mL  Total IN: 1140 mL    OUT:    Colostomy (mL): 150 mL    Oral Fluid: 0 mL    Voided (mL): 1050 mL  Total OUT: 1200 mL    Total NET: -60 mL        ___________________________________________________  PHYSICAL EXAM    -- CONSTITUTIONAL: NAD, lying in bed  -- NEURO: Awake, alert  sacrum: small dry blood. incision intact, no collections, area soft  LLE: cdi, soft, dressing in palce    ___________________________________________________  LABS                        10.6   8.79  )-----------( 335      ( 17 May 2025 06:48 )             32.3     17 May 2025 06:48    139    |  107    |  6      ----------------------------<  112    3.7     |  27     |  0.79     Ca    8.2        17 May 2025 06:48  Phos  3.5       17 May 2025 06:48  Mg     2.0       17 May 2025 06:48        CAPILLARY BLOOD GLUCOSE            Urinalysis Basic - ( 17 May 2025 06:48 )    Color: x / Appearance: x / SG: x / pH: x  Gluc: 112 mg/dL / Ketone: x  / Bili: x / Urobili: x   Blood: x / Protein: x / Nitrite: x   Leuk Esterase: x / RBC: x / WBC x   Sq Epi: x / Non Sq Epi: x / Bacteria: x      ___________________________________________________  MICRO  Recent Cultures:    ___________________________________________________  MEDICATIONS  (STANDING):  dextrose 5% + sodium chloride 0.45%. 1000 milliLiter(s) (95 mL/Hr) IV Continuous <Continuous>  heparin   Injectable 5000 Unit(s) SubCutaneous every 8 hours  pantoprazole  Injectable 40 milliGRAM(s) IV Push every 12 hours    MEDICATIONS  (PRN):  HYDROmorphone  Injectable 0.5 milliGRAM(s) IV Push every 30 minutes PRN Severe Pain (7 - 10)  HYDROmorphone  Injectable 0.25 milliGRAM(s) IV Push every 4 hours PRN Moderate Pain (4 - 6)  ketorolac   Injectable 15 milliGRAM(s) IV Push every 6 hours PRN Mild Pain (1 - 3)  ondansetron Injectable 4 milliGRAM(s) IV Push every 6 hours PRN Nausea and/or Vomiting

## 2025-05-18 NOTE — PROGRESS NOTE ADULT - SUBJECTIVE AND OBJECTIVE BOX
INTERVAL HPI/OVERNIGHT EVENTS: NGT discontinued    STATUS POST:  dx lap and CHRIS     POST OPERATIVE DAY #: 3    SUBJECTIVE: Patient seen at the bedside by the team today. Patient lying in bed resting comfortably in NAD. Reports improvement in abdominal pain. -N/-V.       heparin   Injectable 5000 Unit(s) SubCutaneous every 8 hours      Vital Signs Last 24 Hrs  T(C): 36.7 (18 May 2025 08:25), Max: 36.9 (17 May 2025 20:00)  T(F): 98 (18 May 2025 08:25), Max: 98.5 (17 May 2025 20:00)  HR: 63 (18 May 2025 08:25) (60 - 71)  BP: 134/76 (18 May 2025 08:25) (125/79 - 141/78)  BP(mean): --  RR: 18 (18 May 2025 08:25) (17 - 18)  SpO2: 98% (18 May 2025 08:25) (98% - 99%)    Parameters below as of 18 May 2025 08:25  Patient On (Oxygen Delivery Method): room air      I&O's Detail    17 May 2025 07:01  -  18 May 2025 07:00  --------------------------------------------------------  IN:    dextrose 5% + sodium chloride 0.45%: 1140 mL  Total IN: 1140 mL    OUT:    Colostomy (mL): 150 mL    Oral Fluid: 0 mL    Voided (mL): 1050 mL  Total OUT: 1200 mL    Total NET: -60 mL          General: NAD, resting comfortably in bed  C/V: NSR  Pulm: Nonlabored breathing, no respiratory distress  Abd: soft, TTP RLQ, ND, no rebound or guarding, ostomy PPP  Extrem: WWP, no edema, SCDs in place  Drains:  Mani:      LABS:                        10.6   8.79  )-----------( 335      ( 17 May 2025 06:48 )             32.3     05-17    139  |  107  |  6[L]  ----------------------------<  112[H]  3.7   |  27  |  0.79    Ca    8.2[L]      17 May 2025 06:48  Phos  3.5     05-17  Mg     2.0     05-17        Urinalysis Basic - ( 17 May 2025 06:48 )    Color: x / Appearance: x / SG: x / pH: x  Gluc: 112 mg/dL / Ketone: x  / Bili: x / Urobili: x   Blood: x / Protein: x / Nitrite: x   Leuk Esterase: x / RBC: x / WBC x   Sq Epi: x / Non Sq Epi: x / Bacteria: x        RADIOLOGY & ADDITIONAL STUDIES:

## 2025-05-18 NOTE — PROGRESS NOTE ADULT - ASSESSMENT
59 year old male with PMH of rectal cancer now s/p cystoscopy bilateral stent placement, robotic assisted APR, coccygectomy, end colostomy creation, L gracilis flap recon (5/6). Now s/p dx lap and CHRIS on 5/15.    NPO/mIVF  PPI  Pain/nausea control PRN  DVT ppx  Plastics recs: shuffle gait, no sitting, no adduction  AM labs  Dispo: Outpatient PT

## 2025-05-18 NOTE — PROGRESS NOTE ADULT - ASSESSMENT
59 year old male with PMH of rectal cancer now s/p cystoscopy bilateral stent placement, robotic assisted APR, coccygectomy, end colostomy creation, L gracilis flap recon (5/6).    Plan  - abduction - no adduction  - shuffle walk  - monitor incisional sites for changes  - rest per primary     Nj Mead MD  Plastic and Reconstructive Surgery, PGY4  Available on Microsoft Teams  [facility]: [id number], [place]: [phone] [facility]: Green Team [address] [facility]: [phone]

## 2025-05-19 ENCOUNTER — TRANSCRIPTION ENCOUNTER (OUTPATIENT)
Age: 60
End: 2025-05-19

## 2025-05-19 VITALS
HEART RATE: 88 BPM | TEMPERATURE: 97 F | DIASTOLIC BLOOD PRESSURE: 76 MMHG | SYSTOLIC BLOOD PRESSURE: 112 MMHG | RESPIRATION RATE: 16 BRPM | OXYGEN SATURATION: 100 %

## 2025-05-19 LAB
ANION GAP SERPL CALC-SCNC: 8 MMOL/L — SIGNIFICANT CHANGE UP (ref 5–17)
BUN SERPL-MCNC: 3 MG/DL — LOW (ref 7–23)
CALCIUM SERPL-MCNC: 8.4 MG/DL — SIGNIFICANT CHANGE UP (ref 8.4–10.5)
CHLORIDE SERPL-SCNC: 106 MMOL/L — SIGNIFICANT CHANGE UP (ref 96–108)
CO2 SERPL-SCNC: 27 MMOL/L — SIGNIFICANT CHANGE UP (ref 22–31)
CREAT SERPL-MCNC: 0.86 MG/DL — SIGNIFICANT CHANGE UP (ref 0.5–1.3)
EGFR: 100 ML/MIN/1.73M2 — SIGNIFICANT CHANGE UP
EGFR: 100 ML/MIN/1.73M2 — SIGNIFICANT CHANGE UP
GLUCOSE SERPL-MCNC: 105 MG/DL — HIGH (ref 70–99)
HCT VFR BLD CALC: 29 % — LOW (ref 39–50)
HGB BLD-MCNC: 9.4 G/DL — LOW (ref 13–17)
MAGNESIUM SERPL-MCNC: 1.9 MG/DL — SIGNIFICANT CHANGE UP (ref 1.6–2.6)
MCHC RBC-ENTMCNC: 29.7 PG — SIGNIFICANT CHANGE UP (ref 27–34)
MCHC RBC-ENTMCNC: 32.4 G/DL — SIGNIFICANT CHANGE UP (ref 32–36)
MCV RBC AUTO: 91.5 FL — SIGNIFICANT CHANGE UP (ref 80–100)
NRBC BLD AUTO-RTO: 0 /100 WBCS — SIGNIFICANT CHANGE UP (ref 0–0)
PHOSPHATE SERPL-MCNC: 3 MG/DL — SIGNIFICANT CHANGE UP (ref 2.5–4.5)
PLATELET # BLD AUTO: 322 K/UL — SIGNIFICANT CHANGE UP (ref 150–400)
POTASSIUM SERPL-MCNC: 3.5 MMOL/L — SIGNIFICANT CHANGE UP (ref 3.5–5.3)
POTASSIUM SERPL-SCNC: 3.5 MMOL/L — SIGNIFICANT CHANGE UP (ref 3.5–5.3)
RBC # BLD: 3.17 M/UL — LOW (ref 4.2–5.8)
RBC # FLD: 15.5 % — HIGH (ref 10.3–14.5)
SODIUM SERPL-SCNC: 141 MMOL/L — SIGNIFICANT CHANGE UP (ref 135–145)
WBC # BLD: 8.71 K/UL — SIGNIFICANT CHANGE UP (ref 3.8–10.5)
WBC # FLD AUTO: 8.71 K/UL — SIGNIFICANT CHANGE UP (ref 3.8–10.5)

## 2025-05-19 PROCEDURE — C1889: CPT

## 2025-05-19 PROCEDURE — 87075 CULTR BACTERIA EXCEPT BLOOD: CPT

## 2025-05-19 PROCEDURE — S2900: CPT

## 2025-05-19 PROCEDURE — 74019 RADEX ABDOMEN 2 VIEWS: CPT

## 2025-05-19 PROCEDURE — 36415 COLL VENOUS BLD VENIPUNCTURE: CPT

## 2025-05-19 PROCEDURE — C9399: CPT

## 2025-05-19 PROCEDURE — 87086 URINE CULTURE/COLONY COUNT: CPT

## 2025-05-19 PROCEDURE — 85610 PROTHROMBIN TIME: CPT

## 2025-05-19 PROCEDURE — 88309 TISSUE EXAM BY PATHOLOGIST: CPT

## 2025-05-19 PROCEDURE — 82962 GLUCOSE BLOOD TEST: CPT

## 2025-05-19 PROCEDURE — 88300 SURGICAL PATH GROSS: CPT

## 2025-05-19 PROCEDURE — 85027 COMPLETE CBC AUTOMATED: CPT

## 2025-05-19 PROCEDURE — C1758: CPT

## 2025-05-19 PROCEDURE — 85730 THROMBOPLASTIN TIME PARTIAL: CPT

## 2025-05-19 PROCEDURE — 97116 GAIT TRAINING THERAPY: CPT

## 2025-05-19 PROCEDURE — 71045 X-RAY EXAM CHEST 1 VIEW: CPT

## 2025-05-19 PROCEDURE — C1769: CPT

## 2025-05-19 PROCEDURE — 84100 ASSAY OF PHOSPHORUS: CPT

## 2025-05-19 PROCEDURE — 86901 BLOOD TYPING SEROLOGIC RH(D): CPT

## 2025-05-19 PROCEDURE — 86900 BLOOD TYPING SEROLOGIC ABO: CPT

## 2025-05-19 PROCEDURE — 86850 RBC ANTIBODY SCREEN: CPT

## 2025-05-19 PROCEDURE — 97162 PT EVAL MOD COMPLEX 30 MIN: CPT

## 2025-05-19 PROCEDURE — 80048 BASIC METABOLIC PNL TOTAL CA: CPT

## 2025-05-19 PROCEDURE — 82365 CALCULUS SPECTROSCOPY: CPT

## 2025-05-19 PROCEDURE — 74177 CT ABD & PELVIS W/CONTRAST: CPT

## 2025-05-19 PROCEDURE — 83735 ASSAY OF MAGNESIUM: CPT

## 2025-05-19 RX ORDER — OXYCODONE HYDROCHLORIDE 30 MG/1
2.5 TABLET ORAL EVERY 6 HOURS
Refills: 0 | Status: DISCONTINUED | OUTPATIENT
Start: 2025-05-19 | End: 2025-05-19

## 2025-05-19 RX ORDER — OXYCODONE HYDROCHLORIDE 30 MG/1
1 TABLET ORAL
Qty: 12 | Refills: 0
Start: 2025-05-19 | End: 2025-05-21

## 2025-05-19 RX ORDER — ACETAMINOPHEN 500 MG/5ML
1000 LIQUID (ML) ORAL EVERY 6 HOURS
Refills: 0 | Status: DISCONTINUED | OUTPATIENT
Start: 2025-05-19 | End: 2025-05-19

## 2025-05-19 RX ORDER — DOCUSATE SODIUM 100 MG
1 CAPSULE ORAL
Qty: 10 | Refills: 0
Start: 2025-05-19 | End: 2025-05-23

## 2025-05-19 RX ORDER — OXYCODONE HYDROCHLORIDE 30 MG/1
5 TABLET ORAL EVERY 6 HOURS
Refills: 0 | Status: DISCONTINUED | OUTPATIENT
Start: 2025-05-19 | End: 2025-05-19

## 2025-05-19 RX ADMIN — Medication 20 MILLIEQUIVALENT(S): at 09:09

## 2025-05-19 RX ADMIN — HEPARIN SODIUM 5000 UNIT(S): 1000 INJECTION INTRAVENOUS; SUBCUTANEOUS at 06:10

## 2025-05-19 RX ADMIN — SODIUM CHLORIDE 95 MILLILITER(S): 9 INJECTION, SOLUTION INTRAVENOUS at 00:54

## 2025-05-19 RX ADMIN — Medication 20 MILLIEQUIVALENT(S): at 11:08

## 2025-05-19 RX ADMIN — HEPARIN SODIUM 5000 UNIT(S): 1000 INJECTION INTRAVENOUS; SUBCUTANEOUS at 13:58

## 2025-05-19 NOTE — PROGRESS NOTE ADULT - PROVIDER SPECIALTY LIST ADULT
Plastic Surgery
Plastic Surgery
Surgery
Plastic Surgery
Plastic Surgery
Surgery
Urology
Plastic Surgery
Plastic Surgery
Surgery
Plastic Surgery
Surgery
Surgery

## 2025-05-19 NOTE — PROGRESS NOTE ADULT - ASSESSMENT
59 year old male with PMH of rectal cancer now s/p cystoscopy bilateral stent placement, robotic assisted APR, coccygectomy, end colostomy creation, L gracilis flap recon (5/6).    Plan  - abduction - no adduction  - shuffle walk  - monitor incisional sites for changes  - rest per primary     Nj Mead MD  Plastic and Reconstructive Surgery, PGY4  Available on Microsoft Teams  [facility]: [id number], [place]: [phone] [facility]: Green Team [id number] [facility]: [phone]

## 2025-05-19 NOTE — PROGRESS NOTE ADULT - REASON FOR ADMISSION
rectal cancer

## 2025-05-19 NOTE — DISCHARGE NOTE NURSING/CASE MANAGEMENT/SOCIAL WORK - NSDCFUADDAPPT_GEN_ALL_CORE_FT
Follow up in [facility]: [phone]    Please schedule a follow-up appointment with Dr. Barth within 2 weeks of discharge from the hospital. You may reach his office at [phone] at your earliest convenience.

## 2025-05-19 NOTE — DISCHARGE NOTE NURSING/CASE MANAGEMENT/SOCIAL WORK - FINANCIAL ASSISTANCE
[facility] provides services at a reduced cost to those who are determined to be eligible through [facility]’s financial assistance program. Information regarding [facility]’s financial assistance program can be found by going to [email or web address] or by calling [phone].

## 2025-05-19 NOTE — PROGRESS NOTE ADULT - ASSESSMENT
59 year old male with PMH of rectal cancer now s/p cystoscopy bilateral stent placement, robotic assisted APR, coccygectomy, end colostomy creation, L gracilis flap recon (5/6). Now s/p dx lap and CHRIS on 5/15.    CLD/mIVF  Pain/nausea control PRN  DVT ppx  Plastics recs: shuffle gait, no sitting, no adduction  AM labs  Dispo: Outpatient PT 59 year old male with PMH of rectal cancer now s/p cystoscopy bilateral stent placement, robotic assisted APR, coccygectomy, end colostomy creation, L gracilis flap recon (5/6). Now s/p dx lap and CHRIS on 5/15.    LRD  Pain/nausea control PRN  DVT ppx  Plastics recs: shuffle gait, no sitting, no adduction  Dispo: Outpatient PT

## 2025-05-19 NOTE — PROGRESS NOTE ADULT - SUBJECTIVE AND OBJECTIVE BOX
HX: robotic assisted APR, coccygectomy, end colostomy creation, L gracilis flap recon (5/6). Now s/p dx lap and CHRIS on 5/15.    SUBJECTIVE:      MEDICATIONS  (STANDING):  dextrose 5% + sodium chloride 0.45%. 1000 milliLiter(s) (95 mL/Hr) IV Continuous <Continuous>  heparin   Injectable 5000 Unit(s) SubCutaneous every 8 hours    MEDICATIONS  (PRN):  HYDROmorphone  Injectable 0.5 milliGRAM(s) IV Push every 30 minutes PRN Severe Pain (7 - 10)  HYDROmorphone  Injectable 0.25 milliGRAM(s) IV Push every 4 hours PRN Moderate Pain (4 - 6)  ondansetron Injectable 4 milliGRAM(s) IV Push every 6 hours PRN Nausea and/or Vomiting      Vital Signs Last 24 Hrs  T(C): 37.1 (19 May 2025 05:03), Max: 37.1 (19 May 2025 05:03)  T(F): 98.7 (19 May 2025 05:03), Max: 98.7 (19 May 2025 05:03)  HR: 57 (19 May 2025 05:03) (57 - 73)  BP: 146/81 (19 May 2025 05:03) (133/81 - 147/83)  BP(mean): 103 (19 May 2025 05:03) (98 - 103)  RR: 17 (19 May 2025 05:03) (17 - 18)  SpO2: 98% (19 May 2025 05:03) (97% - 100%)    Parameters below as of 19 May 2025 05:03  Patient On (Oxygen Delivery Method): room air        PHYSICAL EXAM  General: NAD, resting comfortably  Pulmonary: normal resp effort  Cardiovascular: NSR  Abdominal: soft, TTP RLQ, ND, no rebound or guarding, ostomy PPP  Extremities: WWP, no clubbing/cyanosis/edema  Neuro: A/O x 3, no focal deficits    I&O's Detail    17 May 2025 07:01  -  18 May 2025 07:00  --------------------------------------------------------  IN:    dextrose 5% + sodium chloride 0.45%: 1140 mL  Total IN: 1140 mL    OUT:    Colostomy (mL): 150 mL    Oral Fluid: 0 mL    Voided (mL): 1050 mL  Total OUT: 1200 mL    Total NET: -60 mL      18 May 2025 07:01  -  19 May 2025 06:03  --------------------------------------------------------  IN:    dextrose 5% + sodium chloride 0.45%: 2185 mL  Total IN: 2185 mL    OUT:    Colostomy (mL): 300 mL    Voided (mL): 2050 mL  Total OUT: 2350 mL    Total NET: -165 mL          LABS:                        10.6   8.79  )-----------( 335      ( 17 May 2025 06:48 )             32.3     05-17    139  |  107  |  6[L]  ----------------------------<  112[H]  3.7   |  27  |  0.79    Ca    8.2[L]      17 May 2025 06:48  Phos  3.5     05-17  Mg     2.0     05-17        Urinalysis Basic - ( 17 May 2025 06:48 )    Color: x / Appearance: x / SG: x / pH: x  Gluc: 112 mg/dL / Ketone: x  / Bili: x / Urobili: x   Blood: x / Protein: x / Nitrite: x   Leuk Esterase: x / RBC: x / WBC x   Sq Epi: x / Non Sq Epi: x / Bacteria: x        RADIOLOGY & ADDITIONAL STUDIES: HX: robotic assisted APR, coccygectomy, end colostomy creation, L gracilis flap recon (5/6). Now s/p dx lap and CHRIS on 5/15.    SUBJECTIVE: Patient seen and examined at bedside. Pain well controlled, tolerating CLD, no pain/nausea. +gas/stool in ostomy bag.       MEDICATIONS  (STANDING):  dextrose 5% + sodium chloride 0.45%. 1000 milliLiter(s) (95 mL/Hr) IV Continuous <Continuous>  heparin   Injectable 5000 Unit(s) SubCutaneous every 8 hours    MEDICATIONS  (PRN):  HYDROmorphone  Injectable 0.5 milliGRAM(s) IV Push every 30 minutes PRN Severe Pain (7 - 10)  HYDROmorphone  Injectable 0.25 milliGRAM(s) IV Push every 4 hours PRN Moderate Pain (4 - 6)  ondansetron Injectable 4 milliGRAM(s) IV Push every 6 hours PRN Nausea and/or Vomiting      Vital Signs Last 24 Hrs  T(C): 37.1 (19 May 2025 05:03), Max: 37.1 (19 May 2025 05:03)  T(F): 98.7 (19 May 2025 05:03), Max: 98.7 (19 May 2025 05:03)  HR: 57 (19 May 2025 05:03) (57 - 73)  BP: 146/81 (19 May 2025 05:03) (133/81 - 147/83)  BP(mean): 103 (19 May 2025 05:03) (98 - 103)  RR: 17 (19 May 2025 05:03) (17 - 18)  SpO2: 98% (19 May 2025 05:03) (97% - 100%)    Parameters below as of 19 May 2025 05:03  Patient On (Oxygen Delivery Method): room air        PHYSICAL EXAM  General: NAD, resting comfortably  Pulmonary: normal resp effort  Cardiovascular: NSR  Abdominal: soft, nonTTP, ND, no rebound or guarding, ostomy PPP  Extremities: WWP, no clubbing/cyanosis/edema  Neuro: A/O x 3, no focal deficits    I&O's Detail    17 May 2025 07:01  -  18 May 2025 07:00  --------------------------------------------------------  IN:    dextrose 5% + sodium chloride 0.45%: 1140 mL  Total IN: 1140 mL    OUT:    Colostomy (mL): 150 mL    Oral Fluid: 0 mL    Voided (mL): 1050 mL  Total OUT: 1200 mL    Total NET: -60 mL      18 May 2025 07:01  -  19 May 2025 06:03  --------------------------------------------------------  IN:    dextrose 5% + sodium chloride 0.45%: 2185 mL  Total IN: 2185 mL    OUT:    Colostomy (mL): 300 mL    Voided (mL): 2050 mL  Total OUT: 2350 mL    Total NET: -165 mL          LABS:                        10.6   8.79  )-----------( 335      ( 17 May 2025 06:48 )             32.3     05-17    139  |  107  |  6[L]  ----------------------------<  112[H]  3.7   |  27  |  0.79    Ca    8.2[L]      17 May 2025 06:48  Phos  3.5     05-17  Mg     2.0     05-17        Urinalysis Basic - ( 17 May 2025 06:48 )    Color: x / Appearance: x / SG: x / pH: x  Gluc: 112 mg/dL / Ketone: x  / Bili: x / Urobili: x   Blood: x / Protein: x / Nitrite: x   Leuk Esterase: x / RBC: x / WBC x   Sq Epi: x / Non Sq Epi: x / Bacteria: x        RADIOLOGY & ADDITIONAL STUDIES:

## 2025-05-19 NOTE — DISCHARGE NOTE NURSING/CASE MANAGEMENT/SOCIAL WORK - PATIENT PORTAL LINK FT
You can access the FollowMyHealth Patient Portal offered by [facility] by registering at the following website: [email or web address]. By joining [facility]’s FollowMyHealth portal, you will also be able to view your health information using other applications (apps) compatible with our system.

## 2025-05-19 NOTE — PROGRESS NOTE ADULT - SUBJECTIVE AND OBJECTIVE BOX
Plastic Surgery Progress Note (pg [facility]: [id number], [facility]: [phone])    SUBJECTIVE  The patient was seen and examined. No acute events overnight.    OBJECTIVE  ___________________________________________________  VITAL SIGNS / I&O's   Vital Signs Last 24 Hrs  T(C): 37.1 (19 May 2025 05:03), Max: 37.1 (19 May 2025 05:03)  T(F): 98.7 (19 May 2025 05:03), Max: 98.7 (19 May 2025 05:03)  HR: 57 (19 May 2025 05:03) (57 - 73)  BP: 146/81 (19 May 2025 05:03) (133/81 - 147/83)  BP(mean): 103 (19 May 2025 05:03) (98 - 103)  RR: 17 (19 May 2025 05:03) (17 - 18)  SpO2: 98% (19 May 2025 05:03) (97% - 100%)    Parameters below as of 19 May 2025 05:03  Patient On (Oxygen Delivery Method): room air          17 May 2025 07:01  -  18 May 2025 07:00  --------------------------------------------------------  IN:    dextrose 5% + sodium chloride 0.45%: 1140 mL  Total IN: 1140 mL    OUT:    Colostomy (mL): 150 mL    Oral Fluid: 0 mL    Voided (mL): 1050 mL  Total OUT: 1200 mL    Total NET: -60 mL      18 May 2025 07:01  -  19 May 2025 06:49  --------------------------------------------------------  IN:    dextrose 5% + sodium chloride 0.45%: 2280 mL  Total IN: 2280 mL    OUT:    Colostomy (mL): 310 mL    Voided (mL): 2050 mL  Total OUT: 2360 mL    Total NET: -80 mL        ___________________________________________________  PHYSICAL EXAM      -- NEURO: Awake, alert  sacrum: small dry blood. incision intact, no collections, area soft  LLE: cdi, soft, dressing in palce    ___________________________________________________  LABS                        9.4    8.71  )-----------( 322      ( 19 May 2025 05:30 )             29.0     19 May 2025 05:30    141    |  106    |  3      ----------------------------<  105    3.5     |  27     |  0.86     Ca    8.4        19 May 2025 05:30  Phos  3.0       19 May 2025 05:30  Mg     1.9       19 May 2025 05:30        CAPILLARY BLOOD GLUCOSE            Urinalysis Basic - ( 19 May 2025 05:30 )    Color: x / Appearance: x / SG: x / pH: x  Gluc: 105 mg/dL / Ketone: x  / Bili: x / Urobili: x   Blood: x / Protein: x / Nitrite: x   Leuk Esterase: x / RBC: x / WBC x   Sq Epi: x / Non Sq Epi: x / Bacteria: x      ___________________________________________________  MICRO  Recent Cultures:    ___________________________________________________  MEDICATIONS  (STANDING):  dextrose 5% + sodium chloride 0.45%. 1000 milliLiter(s) (95 mL/Hr) IV Continuous <Continuous>  heparin   Injectable 5000 Unit(s) SubCutaneous every 8 hours    MEDICATIONS  (PRN):  HYDROmorphone  Injectable 0.5 milliGRAM(s) IV Push every 30 minutes PRN Severe Pain (7 - 10)  HYDROmorphone  Injectable 0.25 milliGRAM(s) IV Push every 4 hours PRN Moderate Pain (4 - 6)  ondansetron Injectable 4 milliGRAM(s) IV Push every 6 hours PRN Nausea and/or Vomiting

## 2025-05-23 DIAGNOSIS — N21.0 CALCULUS IN BLADDER: ICD-10-CM

## 2025-05-23 DIAGNOSIS — Z92.21 PERSONAL HISTORY OF ANTINEOPLASTIC CHEMOTHERAPY: ICD-10-CM

## 2025-05-23 DIAGNOSIS — K63.1 PERFORATION OF INTESTINE (NONTRAUMATIC): ICD-10-CM

## 2025-05-23 DIAGNOSIS — Z95.828 PRESENCE OF OTHER VASCULAR IMPLANTS AND GRAFTS: ICD-10-CM

## 2025-05-23 DIAGNOSIS — K56.609 UNSPECIFIED INTESTINAL OBSTRUCTION, UNSPECIFIED AS TO PARTIAL VERSUS COMPLETE OBSTRUCTION: ICD-10-CM

## 2025-05-23 DIAGNOSIS — C20 MALIGNANT NEOPLASM OF RECTUM: ICD-10-CM

## 2025-05-23 DIAGNOSIS — N40.0 BENIGN PROSTATIC HYPERPLASIA WITHOUT LOWER URINARY TRACT SYMPTOMS: ICD-10-CM

## 2025-06-13 ENCOUNTER — APPOINTMENT (OUTPATIENT)
Dept: COLORECTAL SURGERY | Facility: CLINIC | Age: 60
End: 2025-06-13
Payer: COMMERCIAL

## 2025-06-13 ENCOUNTER — NON-APPOINTMENT (OUTPATIENT)
Age: 60
End: 2025-06-13

## 2025-06-13 VITALS
HEIGHT: 66.5 IN | DIASTOLIC BLOOD PRESSURE: 83 MMHG | HEART RATE: 66 BPM | TEMPERATURE: 97.1 F | SYSTOLIC BLOOD PRESSURE: 161 MMHG | WEIGHT: 119 LBS | BODY MASS INDEX: 18.9 KG/M2

## 2025-06-13 PROCEDURE — 99024 POSTOP FOLLOW-UP VISIT: CPT

## 2025-08-11 ENCOUNTER — NON-APPOINTMENT (OUTPATIENT)
Age: 60
End: 2025-08-11

## (undated) DEVICE — WARMING BLANKET UPPER ADULT

## (undated) DEVICE — DRAPE TOP SHEET 53" X 101"

## (undated) DEVICE — MESH UNDERWEAR MATERNITY XX-XXXL

## (undated) DEVICE — MARKING PEN W RULER

## (undated) DEVICE — TROCAR COVIDIEN VERSAONE FIXATION CANNULA 5MM

## (undated) DEVICE — POSITIONER PINK PAD PIGAZZI SYSTEM XL W ARM PROTECTOR

## (undated) DEVICE — XI OBTURATOR OPTICAL BLADELESS 8MM

## (undated) DEVICE — DRAPE 1/2 SHEET 40X57"

## (undated) DEVICE — LIGASURE BLUNT TIP 5MM X 37CM

## (undated) DEVICE — XI SCISSOR TIP COVER

## (undated) DEVICE — KIT ENDO PROCEDURE CUST W/VLV

## (undated) DEVICE — DRSG DERMABOND 0.7ML

## (undated) DEVICE — FORCEP RADIAL JAW 4 W NDL 2.2MM 2.8MM 240CM ORANGE DISP

## (undated) DEVICE — SYR CONTROL LUER LOK 10CC

## (undated) DEVICE — TROCAR APPLIED MEDICAL KII BALLOON BLUNT TIP 12MM X 100MM

## (undated) DEVICE — VENODYNE/SCD SLEEVE CALF MEDIUM

## (undated) DEVICE — TROCAR COVIDIEN VERSAPORT BLADELESS OPTICAL 5MM STANDARD

## (undated) DEVICE — ELCTR GROUNDING PAD ADULT COVIDIEN

## (undated) DEVICE — BLADE SURGICAL #15 CARBON

## (undated) DEVICE — XI ARM FORCEP FENESTRATED BIPOLAR 8MM

## (undated) DEVICE — ELCTR PENCIL SMOKE EVACUATOR COATED PUSH BUTTON 70MM

## (undated) DEVICE — LIGASURE IMPACT

## (undated) DEVICE — ELCTR BOVIE PENCIL HANDPIECE ROCKER SWITCH 15FT

## (undated) DEVICE — SUT VICRYL 0 18" ENDOLOOP LIGATURE

## (undated) DEVICE — NDL SPINAL 22G X 3.5" (BLACK)

## (undated) DEVICE — SUT VICRYL 3-0 18" SH (POP-OFF)

## (undated) DEVICE — CLIPPER BLADE GENERAL USE

## (undated) DEVICE — DRAPE 3/4 SHEET 52X76"

## (undated) DEVICE — XI DRAPE ARM

## (undated) DEVICE — NDL HYPO SAFE 22G X 1.5" (BLACK)

## (undated) DEVICE — PACK PERI GYN

## (undated) DEVICE — PACK GENERAL LAPAROSCOPY

## (undated) DEVICE — SYR LUER LOK 30CC

## (undated) DEVICE — SPECIMEN CONTAINER 16OZ

## (undated) DEVICE — TIP METZENBAUM SCISSOR MONOPOLAR ENDOCUT (ORANGE)

## (undated) DEVICE — BLADE SURGICAL #10 CARBON

## (undated) DEVICE — DRAIN PENROSE 5/8" X 18" SILICONE

## (undated) DEVICE — SUT SILK 3-0 18" SH (POP-OFF)

## (undated) DEVICE — DRSG TAPE MICROFOAM 3"

## (undated) DEVICE — Device

## (undated) DEVICE — XI DRAPE COLUMN

## (undated) DEVICE — STOPCOCK 4-WAY

## (undated) DEVICE — INSUFFLATION NDL COVIDIEN SURGINEEDLE VERESS 120MM

## (undated) DEVICE — SUT VICRYL 3-0 27" SH

## (undated) DEVICE — TUBING STRYKER PNEUMOCLEAR HIGH FLOW HEATED

## (undated) DEVICE — SUT VICRYL 0 27" UR-6

## (undated) DEVICE — SUT VICRYL 2-0 27" SH

## (undated) DEVICE — GLV 8 PROTEXIS (WHITE)

## (undated) DEVICE — GAUZE SPONGE 12PLY DMT MT 8X4

## (undated) DEVICE — PACK GENERAL MINOR

## (undated) DEVICE — SUT VICRYL PLUS 3-0 27" SH

## (undated) DEVICE — ELCTR BOVIE PENCIL SMOKE EVACUATION

## (undated) DEVICE — DRSG TELFA 2 X 3

## (undated) DEVICE — STAPLER SUREFORM CRV TIP 30X8MM

## (undated) DEVICE — POSITIONER FOAM EGG CRATE ULNAR 2PCS (PINK)

## (undated) DEVICE — LUBRICATING JELLY ONESHOT 1.25OZ

## (undated) DEVICE — XI SEAL UNIVERSIAL 5-12MM

## (undated) DEVICE — SPECIMEN CONTAINER 4OZ

## (undated) DEVICE — GLV 7.5 PROTEXIS (WHITE)

## (undated) DEVICE — XI ARM SCISSOR MONO CURVED

## (undated) DEVICE — SUT MONOCRYL 4-0 27" PS-2 UNDYED

## (undated) DEVICE — SOL IRR BAG NS 0.9% 3000ML

## (undated) DEVICE — PACK ABDOMINAL GENERAL CLOSING

## (undated) DEVICE — XI VESSEL SEALER

## (undated) DEVICE — Q TIP 6" WOOD STEM

## (undated) DEVICE — D HELP - CLEARVIEW CLEARIFY SYSTEM

## (undated) DEVICE — TUBING STRYKER PNEUMOSURE HI FLOW INSUFFLATOR

## (undated) DEVICE — PREP BETADINE KIT

## (undated) DEVICE — FOLEY TRAY 16FR 5CC LF UMETER CLOSED

## (undated) DEVICE — DRAPE MICROSCOPE LEICA 26" X 150"

## (undated) DEVICE — XI STAPLER SUREFORM 60

## (undated) DEVICE — XI ARM GRASPER TIP UP FENESTRATED

## (undated) DEVICE — POSITIONER STRAP KNEE & BODY 3X60" DISP

## (undated) DEVICE — ELCTR BOVIE TIP BLADE VALLEYLAB 6.5"